# Patient Record
Sex: FEMALE | Race: BLACK OR AFRICAN AMERICAN | NOT HISPANIC OR LATINO
[De-identification: names, ages, dates, MRNs, and addresses within clinical notes are randomized per-mention and may not be internally consistent; named-entity substitution may affect disease eponyms.]

---

## 2023-07-20 ENCOUNTER — NON-APPOINTMENT (OUTPATIENT)
Age: 38
End: 2023-07-20

## 2023-08-14 ENCOUNTER — APPOINTMENT (OUTPATIENT)
Dept: OBGYN | Facility: CLINIC | Age: 38
End: 2023-08-14
Payer: COMMERCIAL

## 2023-08-14 VITALS
DIASTOLIC BLOOD PRESSURE: 80 MMHG | SYSTOLIC BLOOD PRESSURE: 122 MMHG | HEIGHT: 64 IN | WEIGHT: 220 LBS | BODY MASS INDEX: 37.56 KG/M2

## 2023-08-14 PROCEDURE — 76815 OB US LIMITED FETUS(S): CPT

## 2023-08-14 PROCEDURE — 99214 OFFICE O/P EST MOD 30 MIN: CPT | Mod: 25

## 2023-08-14 NOTE — PHYSICAL EXAM
[Appropriately responsive] : appropriately responsive [Alert] : alert [No Acute Distress] : no acute distress [Labia Majora] : normal [Labia Minora] : normal [Normal] : normal [Soft] : soft [Non-tender] : non-tender [Non-distended] : non-distended [Oriented x3] : oriented x3 [No Bleeding] : There was no active vaginal bleeding [FreeTextEntry5] : minimal brown spotting from closed cervical os

## 2023-08-14 NOTE — PROCEDURE
[Transvaginal OB Sonogram] : Transvaginal OB Sonogram [Transabdominal OB Sonogram] : Transabdominal OB Sonogram [Fetal Heart] : fetal heart present [CRL: ___ (mm)] : CRL - [unfilled]Umm [Yolk Sac] : no yolk sac [FreeTextEntry1] : Unable to visualize endometrial lining or uterine contour transvaginally. fetus + FH visualized transabdominally

## 2023-08-14 NOTE — HISTORY OF PRESENT ILLNESS
[Patient reported PAP Smear was normal] : Patient reported PAP Smear was normal [N] : Patient does not use contraception [Monogamous (Male Partner)] : is monogamous with a male partner [Y] : Positive pregnancy history [Normal Amount/Duration] :  normal amount and duration [Regular Cycle Intervals] : periods have been regular [Currently Active] : currently active [Men] : men [No] : No [PapSmeardate] : 4/26/23 [TextBox_31] : Patient reports normal, has results on phone [LMPDate] : 5/29/23 [MensesFreq] : 26-27 [MensesLength] : 5-6 [PGxTotal] : 1 [PGHxFullTerm] : 0 [PGHxPremature] : 0 [PGHxAbortions] : 0 [[facility]] : 0 [PGHxABInduced] : 0 [PGHxABSpont] : 0 [PGHxEctopic] : 0 [PGHxMultBirths] : 0 [FreeTextEntry1] : 5/29/23

## 2023-08-14 NOTE — REVIEW OF SYSTEMS
[Constipation] : constipation [Nausea] : nausea [Negative] : Heme/Lymph [Abdominal Pain] : no abdominal pain [Diarrhea] : diarrhea [Vomiting] : no vomiting

## 2023-08-14 NOTE — DISCUSSION/SUMMARY
[FreeTextEntry1] : 39yo  at 11+0 by LMP c/w 1st TM US presents for initial PNV. -Chlamydia/gonorrhea aptima collected -+FH on TAUS with CRL c/w LMP -Unable to visualize IUP or endometrial lining transvaginally - pt sent to [facility] for further scanning. Counseled pt she may hve a severely anteflexed uterus vs mullerian abnormality vs cornual pregnancy therefore SAB and ectopic precautions given in detail and pt/her partner verbalized understanding to present to nearest ED if any abnormal S&S develop. -A+ blood type from outpatient records -Pt to rerun in 1 week - if viable IUP, will receive prenatal labs with NIPT at that time

## 2023-08-15 LAB
C TRACH RRNA SPEC QL NAA+PROBE: NOT DETECTED
N GONORRHOEA RRNA SPEC QL NAA+PROBE: NOT DETECTED
SOURCE AMPLIFICATION: NORMAL

## 2023-08-22 ENCOUNTER — APPOINTMENT (OUTPATIENT)
Dept: ANTEPARTUM | Facility: CLINIC | Age: 38
End: 2023-08-22
Payer: COMMERCIAL

## 2023-08-22 ENCOUNTER — ASOB RESULT (OUTPATIENT)
Age: 38
End: 2023-08-22

## 2023-08-22 PROCEDURE — 93976 VASCULAR STUDY: CPT

## 2023-08-22 PROCEDURE — 76813 OB US NUCHAL MEAS 1 GEST: CPT

## 2023-08-25 ENCOUNTER — APPOINTMENT (OUTPATIENT)
Dept: OBGYN | Facility: CLINIC | Age: 38
End: 2023-08-25
Payer: COMMERCIAL

## 2023-08-25 VITALS
OXYGEN SATURATION: 100 % | HEART RATE: 88 BPM | BODY MASS INDEX: 37.76 KG/M2 | WEIGHT: 220 LBS | DIASTOLIC BLOOD PRESSURE: 64 MMHG | SYSTOLIC BLOOD PRESSURE: 120 MMHG

## 2023-08-25 PROCEDURE — 0501F PRENATAL FLOW SHEET: CPT

## 2023-08-25 PROCEDURE — 0500F INITIAL PRENATAL CARE VISIT: CPT

## 2023-08-26 LAB
ALBUMIN SERPL ELPH-MCNC: 4.6 G/DL
ALP BLD-CCNC: 57 U/L
ALT SERPL-CCNC: 40 U/L
ANION GAP SERPL CALC-SCNC: 12 MMOL/L
AST SERPL-CCNC: 22 U/L
BILIRUB SERPL-MCNC: 0.2 MG/DL
BUN SERPL-MCNC: 11 MG/DL
CALCIUM SERPL-MCNC: 9.8 MG/DL
CHLORIDE SERPL-SCNC: 103 MMOL/L
CO2 SERPL-SCNC: 21 MMOL/L
CREAT SERPL-MCNC: 0.62 MG/DL
EGFR: 118 ML/MIN/1.73M2
ESTIMATED AVERAGE GLUCOSE: 105 MG/DL
GLUCOSE SERPL-MCNC: 83 MG/DL
HBA1C MFR BLD HPLC: 5.3 %
HBV SURFACE AG SER QL: NONREACTIVE
HCV AB SER QL: NONREACTIVE
HCV S/CO RATIO: 0.19 S/CO
HIV1+2 AB SPEC QL IA.RAPID: NONREACTIVE
MEV IGG FLD QL IA: >300 AU/ML
MEV IGG+IGM SER-IMP: POSITIVE
POTASSIUM SERPL-SCNC: 3.7 MMOL/L
PROT SERPL-MCNC: 7 G/DL
RUBV IGG FLD-ACNC: 1.6 INDEX
RUBV IGG SER-IMP: POSITIVE
SODIUM SERPL-SCNC: 137 MMOL/L
T GONDII AB SER-IMP: NEGATIVE
T GONDII AB SER-IMP: POSITIVE
T GONDII IGG SER QL: 55.6 IU/ML
T GONDII IGM SER QL: 3.2 AU/ML
T PALLIDUM AB SER QL IA: NEGATIVE
VZV AB TITR SER: POSITIVE
VZV IGG SER IF-ACNC: 1435 INDEX

## 2023-08-27 ENCOUNTER — TRANSCRIPTION ENCOUNTER (OUTPATIENT)
Age: 38
End: 2023-08-27

## 2023-08-28 ENCOUNTER — NON-APPOINTMENT (OUTPATIENT)
Age: 38
End: 2023-08-28

## 2023-08-28 LAB
ABO + RH PNL BLD: NORMAL
BLD GP AB SCN SERPL QL: NORMAL
HGB A MFR BLD: 97 %
HGB A2 MFR BLD: 3 %
HGB FRACT BLD-IMP: NORMAL

## 2023-08-31 LAB
AR GENE MUT ANL BLD/T: NORMAL
B19V IGG SER QL IA: 1.37 INDEX
B19V IGG+IGM SER-IMP: NORMAL
B19V IGG+IGM SER-IMP: POSITIVE
B19V IGM FLD-ACNC: 0.32 INDEX
B19V IGM SER-ACNC: NEGATIVE
CFTR MUT TESTED BLD/T: NEGATIVE

## 2023-09-05 LAB — FMR1 GENE MUT ANL BLD/T: NORMAL

## 2023-09-19 ENCOUNTER — NON-APPOINTMENT (OUTPATIENT)
Age: 38
End: 2023-09-19

## 2023-09-22 ENCOUNTER — NON-APPOINTMENT (OUTPATIENT)
Age: 38
End: 2023-09-22

## 2023-09-22 ENCOUNTER — APPOINTMENT (OUTPATIENT)
Dept: OBGYN | Facility: CLINIC | Age: 38
End: 2023-09-22
Payer: COMMERCIAL

## 2023-09-22 VITALS
DIASTOLIC BLOOD PRESSURE: 70 MMHG | SYSTOLIC BLOOD PRESSURE: 110 MMHG | OXYGEN SATURATION: 98 % | WEIGHT: 217 LBS | BODY MASS INDEX: 37.25 KG/M2 | HEART RATE: 91 BPM

## 2023-09-22 DIAGNOSIS — N89.8 OTHER SPECIFIED NONINFLAMMATORY DISORDERS OF VAGINA: ICD-10-CM

## 2023-09-22 PROCEDURE — 0502F SUBSEQUENT PRENATAL CARE: CPT

## 2023-09-26 LAB
AFP MOM: 1.48
AFP VALUE: 47.6 NG/ML
ALPHA FETOPROTEIN SERUM COMMENT: NORMAL
ALPHA FETOPROTEIN SERUM INTERPRETATION: NORMAL
ALPHA FETOPROTEIN SERUM RESULTS: NORMAL
ALPHA FETOPROTEIN SERUM TEST RESULTS: NORMAL
GESTATIONAL AGE BASED ON: NORMAL
GESTATIONAL AGE ON COLLECTION DATE: 16.6 WEEKS
INSULIN DEP DIABETES: NO
MATERNAL AGE AT EDD AFP: 38 YR
MULTIPLE GESTATION: NO
OSBR RISK 1 IN: 5772
RACE: NORMAL
WEIGHT AFP: 217 LBS

## 2023-10-03 ENCOUNTER — NON-APPOINTMENT (OUTPATIENT)
Age: 38
End: 2023-10-03

## 2023-10-03 LAB
A VAGINAE DNA VAG QL NAA+PROBE: NORMAL
BVAB2 DNA VAG QL NAA+PROBE: NORMAL
C KRUSEI DNA VAG QL NAA+PROBE: NEGATIVE
C KRUSEI DNA VAG QL NAA+PROBE: POSITIVE
C TRACH RRNA SPEC QL NAA+PROBE: NEGATIVE
CANDIDA DNA VAG QL NAA+PROBE: NEGATIVE
MEGA1 DNA VAG QL NAA+PROBE: NORMAL
N GONORRHOEA RRNA SPEC QL NAA+PROBE: NEGATIVE
T VAGINALIS RRNA SPEC QL NAA+PROBE: NEGATIVE

## 2023-10-12 ENCOUNTER — NON-APPOINTMENT (OUTPATIENT)
Age: 38
End: 2023-10-12

## 2023-10-12 DIAGNOSIS — B37.31 ACUTE CANDIDIASIS OF VULVA AND VAGINA: ICD-10-CM

## 2023-10-12 RX ORDER — CLOTRIMAZOLE 1 %
1 CREAM WITH APPLICATOR VAGINAL
Qty: 1 | Refills: 0 | Status: ACTIVE | COMMUNITY
Start: 2023-10-12 | End: 1900-01-01

## 2023-10-16 ENCOUNTER — APPOINTMENT (OUTPATIENT)
Dept: ANTEPARTUM | Facility: CLINIC | Age: 38
End: 2023-10-16
Payer: COMMERCIAL

## 2023-10-16 ENCOUNTER — ASOB RESULT (OUTPATIENT)
Age: 38
End: 2023-10-16

## 2023-10-16 PROCEDURE — 76817 TRANSVAGINAL US OBSTETRIC: CPT

## 2023-10-16 PROCEDURE — 76811 OB US DETAILED SNGL FETUS: CPT

## 2023-10-18 ENCOUNTER — NON-APPOINTMENT (OUTPATIENT)
Age: 38
End: 2023-10-18

## 2023-10-18 ENCOUNTER — APPOINTMENT (OUTPATIENT)
Dept: OBGYN | Facility: CLINIC | Age: 38
End: 2023-10-18
Payer: COMMERCIAL

## 2023-10-18 VITALS
BODY MASS INDEX: 36.39 KG/M2 | OXYGEN SATURATION: 100 % | WEIGHT: 212 LBS | DIASTOLIC BLOOD PRESSURE: 70 MMHG | SYSTOLIC BLOOD PRESSURE: 120 MMHG | HEART RATE: 97 BPM

## 2023-10-18 DIAGNOSIS — Z3A.11 11 WEEKS GESTATION OF PREGNANCY: ICD-10-CM

## 2023-10-18 PROCEDURE — 0502F SUBSEQUENT PRENATAL CARE: CPT

## 2023-11-14 ENCOUNTER — ASOB RESULT (OUTPATIENT)
Age: 38
End: 2023-11-14

## 2023-11-14 ENCOUNTER — APPOINTMENT (OUTPATIENT)
Dept: ANTEPARTUM | Facility: CLINIC | Age: 38
End: 2023-11-14
Payer: COMMERCIAL

## 2023-11-14 PROCEDURE — 76816 OB US FOLLOW-UP PER FETUS: CPT

## 2023-11-14 PROCEDURE — 76820 UMBILICAL ARTERY ECHO: CPT | Mod: 59

## 2023-11-14 PROCEDURE — 76819 FETAL BIOPHYS PROFIL W/O NST: CPT

## 2023-11-15 ENCOUNTER — LABORATORY RESULT (OUTPATIENT)
Age: 38
End: 2023-11-15

## 2023-11-15 ENCOUNTER — APPOINTMENT (OUTPATIENT)
Dept: OBGYN | Facility: CLINIC | Age: 38
End: 2023-11-15
Payer: COMMERCIAL

## 2023-11-15 ENCOUNTER — NON-APPOINTMENT (OUTPATIENT)
Age: 38
End: 2023-11-15

## 2023-11-15 VITALS
OXYGEN SATURATION: 100 % | BODY MASS INDEX: 36.22 KG/M2 | HEART RATE: 118 BPM | WEIGHT: 211 LBS | DIASTOLIC BLOOD PRESSURE: 82 MMHG | SYSTOLIC BLOOD PRESSURE: 140 MMHG

## 2023-11-15 PROCEDURE — 0502F SUBSEQUENT PRENATAL CARE: CPT

## 2023-11-16 LAB
GLUCOSE 1H P 50 G GLC PO SERPL-MCNC: 123 MG/DL
HCT VFR BLD CALC: 31.1 %
HGB BLD-MCNC: 9.9 G/DL
MCHC RBC-ENTMCNC: 31.2 PG
MCHC RBC-ENTMCNC: 31.8 GM/DL
MCV RBC AUTO: 98.1 FL
PLATELET # BLD AUTO: 356 K/UL
RBC # BLD: 3.17 M/UL
RBC # FLD: 13.4 %
T PALLIDUM AB SER QL IA: NEGATIVE
WBC # FLD AUTO: 7.59 K/UL

## 2023-11-16 RX ORDER — BLOOD PRESSURE TEST KIT-LARGE
KIT MISCELLANEOUS
Qty: 1 | Refills: 0 | Status: ACTIVE | COMMUNITY
Start: 2023-11-16 | End: 1900-01-01

## 2023-11-20 ENCOUNTER — NON-APPOINTMENT (OUTPATIENT)
Age: 38
End: 2023-11-20

## 2023-11-20 LAB — BACTERIA UR CULT: NORMAL

## 2023-11-30 ENCOUNTER — APPOINTMENT (OUTPATIENT)
Dept: OBGYN | Facility: CLINIC | Age: 38
End: 2023-11-30
Payer: COMMERCIAL

## 2023-11-30 VITALS
OXYGEN SATURATION: 98 % | SYSTOLIC BLOOD PRESSURE: 120 MMHG | HEART RATE: 99 BPM | WEIGHT: 212 LBS | BODY MASS INDEX: 36.39 KG/M2 | DIASTOLIC BLOOD PRESSURE: 78 MMHG

## 2023-11-30 PROCEDURE — 0502F SUBSEQUENT PRENATAL CARE: CPT

## 2023-12-01 LAB
CREAT 24H UR-MCNC: 0.7 G/24 H
CREAT ?TM UR-MCNC: 71 MG/DL
PROT 24H UR-MRATE: 11 MG/DL
PROT ?TM UR-MCNC: 24 HR
PROT UR-MCNC: 110 MG/24 H
SPECIMEN VOL 24H UR: 1000 ML

## 2023-12-18 ENCOUNTER — NON-APPOINTMENT (OUTPATIENT)
Age: 38
End: 2023-12-18

## 2023-12-18 ENCOUNTER — APPOINTMENT (OUTPATIENT)
Dept: OBGYN | Facility: CLINIC | Age: 38
End: 2023-12-18
Payer: COMMERCIAL

## 2023-12-18 VITALS
DIASTOLIC BLOOD PRESSURE: 70 MMHG | SYSTOLIC BLOOD PRESSURE: 110 MMHG | BODY MASS INDEX: 35.87 KG/M2 | OXYGEN SATURATION: 98 % | WEIGHT: 209 LBS

## 2023-12-18 PROCEDURE — 0502F SUBSEQUENT PRENATAL CARE: CPT

## 2023-12-21 ENCOUNTER — LABORATORY RESULT (OUTPATIENT)
Age: 38
End: 2023-12-21

## 2023-12-21 ENCOUNTER — APPOINTMENT (OUTPATIENT)
Dept: HEMATOLOGY ONCOLOGY | Facility: CLINIC | Age: 38
End: 2023-12-21
Payer: COMMERCIAL

## 2023-12-21 VITALS
BODY MASS INDEX: 35.51 KG/M2 | HEIGHT: 64 IN | TEMPERATURE: 97.8 F | DIASTOLIC BLOOD PRESSURE: 93 MMHG | RESPIRATION RATE: 18 BRPM | OXYGEN SATURATION: 97 % | SYSTOLIC BLOOD PRESSURE: 130 MMHG | HEART RATE: 102 BPM | WEIGHT: 208 LBS

## 2023-12-21 PROCEDURE — 99204 OFFICE O/P NEW MOD 45 MIN: CPT

## 2023-12-22 LAB
ALBUMIN SERPL ELPH-MCNC: 3.2 G/DL
ALP BLD-CCNC: 116 U/L
ALT SERPL-CCNC: 29 U/L
ANION GAP SERPL CALC-SCNC: 6 MMOL/L
AST SERPL-CCNC: 30 U/L
BILIRUB SERPL-MCNC: 0.7 MG/DL
BUN SERPL-MCNC: 7 MG/DL
CALCIUM SERPL-MCNC: 9.8 MG/DL
CHLORIDE SERPL-SCNC: 108 MMOL/L
CO2 SERPL-SCNC: 25 MMOL/L
CREAT SERPL-MCNC: 0.6 MG/DL
EGFR: 118 ML/MIN/1.73M2
FERRITIN SERPL-MCNC: 84 NG/ML
FOLATE SERPL-MCNC: >20 NG/ML
GLUCOSE SERPL-MCNC: 68 MG/DL
HAPTOGLOB SERPL-MCNC: 250 MG/DL
IRON SATN MFR SERPL: 17 %
IRON SERPL-MCNC: 69 UG/DL
LDH SERPL-CCNC: 226 U/L
POTASSIUM SERPL-SCNC: 4.2 MMOL/L
PROT SERPL-MCNC: 7.1 G/DL
RBC # BLD: 3.21 M/UL
RETICS # AUTO: 2 %
RETICS AGGREG/RBC NFR: 64.5 K/UL
SODIUM SERPL-SCNC: 139 MMOL/L
TIBC SERPL-MCNC: 396 UG/DL
TSH SERPL-ACNC: 0.62 UIU/ML
UIBC SERPL-MCNC: 327 UG/DL
VIT B12 SERPL-MCNC: 529 PG/ML

## 2023-12-23 NOTE — ASSESSMENT
[FreeTextEntry1] : 39 yo F, 29 weeks pregnant, here for initial evaluation.  Labs notable for normocytic anemia, Hb 9.9. Iron studies with very mild iron deficiency, iron saturation 12%. No other cytopenias and per patient she was never told she is anemic before this. Labs in EMR dating back to August 223, start of pregnancy, at which point she was already slightly anemic (Hb 11.2). This might be an underlying hemoglobinopathy (like thalassemia trait). In pregnancy, Physiologic changes during pregnancy result in dilutional anemia despite an overall increase in red blood cell (RBC) mass. Plasma volume increases by 10 to 15 percent at 6 to 12 weeks of gestation, expands rapidly until 30 to 34 weeks, and then plateaus or decreases slightly to term. The total gain at term averages 1100 to 1600 mL and results in a total plasma volume of 4700 to 5200 mL, which is 40 to 50 percent above that prior to pregnancy [3]. The RBC mass also increases, but to a lesser extent (approximately 15 to 25 percent). Typically, these changes result in mild anemia (hemoglobin 10 to 11 g/dL), but there is no specific hemoglobin value that can be used to distinguish physiologic dilutional anemia from other causes of anemia.  Therefore, since at start of pregnancy her Hb was 11.2, this could be all dilutional anemia.  Will check: CBC, Iron panel, hemolysis labs, B12, folate, TSH. RTC in 2 wks to review results and plan next steps. Continue with iron tabs as prescribed by OB until then. All questions answered.

## 2023-12-23 NOTE — HISTORY OF PRESENT ILLNESS
[de-identified] : Offers no complaints. Mild fatigue since start of pregnancy, o/w no bruising bleeding, no SOB, no CLARK. [de-identified] : Юлия Payan is a 38-year-old female 29 wk pregnant,who presents to the clinic for initial consultaiton of anemia.   Heme hx: Never anemic before per patient. She never needed a blood transfuson. FH: DM and cancer PMH: None PSH: dental work SH: no etOH use, no smoking Allergies: NKDA Meds: Prenatal vitamins, 325 mg Fe sulfate daily, ASA 81 (risk of preeclmsia), mg oxide 11/15/2023: Labs done with OB, showing Hb 9.9, MCV 98.1, no other cytopenias. Iron studies with iron saturation of 12%. Ferritin 81. She was subsequently started on po iron supplementation, which she has been taking for 2 wks now.

## 2024-01-02 ENCOUNTER — NON-APPOINTMENT (OUTPATIENT)
Age: 39
End: 2024-01-02

## 2024-01-05 ENCOUNTER — APPOINTMENT (OUTPATIENT)
Dept: OBGYN | Facility: CLINIC | Age: 39
End: 2024-01-05
Payer: MEDICAID

## 2024-01-05 VITALS
OXYGEN SATURATION: 99 % | DIASTOLIC BLOOD PRESSURE: 70 MMHG | WEIGHT: 217 LBS | SYSTOLIC BLOOD PRESSURE: 120 MMHG | BODY MASS INDEX: 37.25 KG/M2

## 2024-01-05 DIAGNOSIS — D25.9 LEIOMYOMA OF UTERUS, UNSPECIFIED: ICD-10-CM

## 2024-01-05 PROCEDURE — 0502F SUBSEQUENT PRENATAL CARE: CPT

## 2024-01-05 RX ORDER — RESPIRATORY SYNCYTIAL VIRUS VACCINE 120MCG/0.5
120 KIT INTRAMUSCULAR
Qty: 1 | Refills: 0 | Status: ACTIVE | COMMUNITY
Start: 2024-01-05 | End: 1900-01-01

## 2024-01-09 ENCOUNTER — ASOB RESULT (OUTPATIENT)
Age: 39
End: 2024-01-09

## 2024-01-09 ENCOUNTER — APPOINTMENT (OUTPATIENT)
Dept: ANTEPARTUM | Facility: CLINIC | Age: 39
End: 2024-01-09
Payer: MEDICAID

## 2024-01-09 PROCEDURE — 76819 FETAL BIOPHYS PROFIL W/O NST: CPT | Mod: 59

## 2024-01-09 PROCEDURE — 76816 OB US FOLLOW-UP PER FETUS: CPT

## 2024-01-09 PROCEDURE — 76820 UMBILICAL ARTERY ECHO: CPT | Mod: 59

## 2024-01-18 ENCOUNTER — LABORATORY RESULT (OUTPATIENT)
Age: 39
End: 2024-01-18

## 2024-01-18 ENCOUNTER — APPOINTMENT (OUTPATIENT)
Dept: HEMATOLOGY ONCOLOGY | Facility: CLINIC | Age: 39
End: 2024-01-18
Payer: MEDICAID

## 2024-01-18 VITALS
TEMPERATURE: 97.9 F | RESPIRATION RATE: 18 BRPM | BODY MASS INDEX: 35.85 KG/M2 | HEART RATE: 108 BPM | OXYGEN SATURATION: 97 % | HEIGHT: 64 IN | WEIGHT: 210 LBS | SYSTOLIC BLOOD PRESSURE: 127 MMHG | DIASTOLIC BLOOD PRESSURE: 74 MMHG

## 2024-01-18 LAB
FERRITIN SERPL-MCNC: 57 NG/ML
IRON SATN MFR SERPL: 14 %
IRON SERPL-MCNC: 66 UG/DL
TIBC SERPL-MCNC: 473 UG/DL
UIBC SERPL-MCNC: 407 UG/DL

## 2024-01-18 PROCEDURE — 99213 OFFICE O/P EST LOW 20 MIN: CPT

## 2024-01-19 ENCOUNTER — APPOINTMENT (OUTPATIENT)
Dept: OBGYN | Facility: CLINIC | Age: 39
End: 2024-01-19
Payer: MEDICAID

## 2024-01-19 ENCOUNTER — NON-APPOINTMENT (OUTPATIENT)
Age: 39
End: 2024-01-19

## 2024-01-19 VITALS
HEART RATE: 98 BPM | BODY MASS INDEX: 36.39 KG/M2 | OXYGEN SATURATION: 98 % | DIASTOLIC BLOOD PRESSURE: 72 MMHG | WEIGHT: 212 LBS | SYSTOLIC BLOOD PRESSURE: 118 MMHG

## 2024-01-19 LAB
FOLATE SERPL-MCNC: >20 NG/ML
VIT B12 SERPL-MCNC: 563 PG/ML

## 2024-01-19 PROCEDURE — 0502F SUBSEQUENT PRENATAL CARE: CPT

## 2024-01-23 ENCOUNTER — OUTPATIENT (OUTPATIENT)
Dept: OUTPATIENT SERVICES | Facility: HOSPITAL | Age: 39
LOS: 1 days | End: 2024-01-23
Payer: COMMERCIAL

## 2024-01-23 ENCOUNTER — APPOINTMENT (OUTPATIENT)
Dept: INFUSION THERAPY | Facility: CLINIC | Age: 39
End: 2024-01-23

## 2024-01-23 VITALS
TEMPERATURE: 98 F | DIASTOLIC BLOOD PRESSURE: 82 MMHG | HEART RATE: 105 BPM | RESPIRATION RATE: 18 BRPM | SYSTOLIC BLOOD PRESSURE: 129 MMHG | HEIGHT: 64 IN | WEIGHT: 214.95 LBS

## 2024-01-23 DIAGNOSIS — O99.019 ANEMIA COMPLICATING PREGNANCY, UNSPECIFIED TRIMESTER: ICD-10-CM

## 2024-01-23 PROCEDURE — 96365 THER/PROPH/DIAG IV INF INIT: CPT

## 2024-01-23 RX ORDER — IRON SUCROSE 20 MG/ML
200 INJECTION, SOLUTION INTRAVENOUS ONCE
Refills: 0 | Status: COMPLETED | OUTPATIENT
Start: 2024-01-23 | End: 2024-01-23

## 2024-01-23 RX ADMIN — IRON SUCROSE 200 MILLIGRAM(S): 20 INJECTION, SOLUTION INTRAVENOUS at 18:01

## 2024-01-23 RX ADMIN — IRON SUCROSE 110 MILLIGRAM(S): 20 INJECTION, SOLUTION INTRAVENOUS at 17:01

## 2024-01-23 NOTE — ASSESSMENT
[FreeTextEntry1] : 39 yo F, 33 weeks, here for follow up of anemia.  Normocytic anemia-hemoglobin 10.0, MCV 93.7 today No other cytopenias and per patient she was never told she is anemic before this. Labs in EMR dating back to , start of pregnancy, at which point she was already slightly anemic (Hb 11.2). This might be an underlying hemoglobinopathy (like thalassemia trait). In pregnancy, Physiologic changes during pregnancy result in dilutional anemia despite an overall increase in red blood cell (RBC) mass. Plasma volume increases by 10 to 15 percent at 6 to 12 weeks of gestation, expands rapidly until 30 to 34 weeks, and then plateaus or decreases slightly to term. The total gain at term averages 1100 to 1600 mL and results in a total plasma volume of 4700 to 5200 mL, which is 40 to 50 percent above that prior to pregnancy [3]. The RBC mass also increases, but to a lesser extent (approximately 15 to 25 percent). Typically, these changes result in mild anemia (hemoglobin 10 to 11 g/dL), but there is no specific hemoglobin value that can be used to distinguish physiologic dilutional anemia from other causes of anemia.  Therefore, since at start of pregnancy her Hb was 11.2, this could be all dilutional anemia.  Overall no evidence of hemolytic anemia, mild iron deficiency noted, she is compliant with iron tabs 1 tab daily.  Will repeat her iron levels today, if still on the borderline low we will give her 1 dose IV iron. She is planned for a . C/w iron tabs Will order IV Venofer x1 or 2 doses depending on result from today Risks and benefits of IV iron infusion discussed, including but not limited limited to allergic reactions Repeat Iron panel 3 wks post delivery Will need testing for thalassemia as well All questions answered  RTC in 3 months

## 2024-01-23 NOTE — HISTORY OF PRESENT ILLNESS
[de-identified] : Юлия Payan is a 38-year-old female 33 wk pregnant, who is here for follow up of anemia.  Heme hx: Never anemic before per patient. She never needed a blood transfuson. FH: DM and cancer PMH: None PSH: dental work SH: no etOH use, no smoking Allergies: NKDA Meds: Prenatal vitamins, 325 mg Fe sulfate daily, ASA 81 (risk of preeclmsia), mg oxide 11/15/2023: Labs done with OB, showing Hb 9.9, MCV 98.1, no other cytopenias. Iron studies with iron saturation of 12%. Ferritin 81. She was subsequently started on po iron supplementation, which she has been taking for 2 wks now. 12/21/2023: Ferritin 57, Iron sat 17%, Hb 9.8, MCV 93.2 [de-identified] : Offers no complaints. Mild fatigue since start of pregnancy, o/w no bruising bleeding, no SOB, no CLARK.

## 2024-01-23 NOTE — HISTORY OF PRESENT ILLNESS
[de-identified] : Юлия Payan is a 38-year-old female 33 wk pregnant, who is here for follow up of anemia.  Heme hx: Never anemic before per patient. She never needed a blood transfuson. FH: DM and cancer PMH: None PSH: dental work SH: no etOH use, no smoking Allergies: NKDA Meds: Prenatal vitamins, 325 mg Fe sulfate daily, ASA 81 (risk of preeclmsia), mg oxide 11/15/2023: Labs done with OB, showing Hb 9.9, MCV 98.1, no other cytopenias. Iron studies with iron saturation of 12%. Ferritin 81. She was subsequently started on po iron supplementation, which she has been taking for 2 wks now. 12/21/2023: Ferritin 57, Iron sat 17%, Hb 9.8, MCV 93.2 [de-identified] : Offers no complaints. Mild fatigue since start of pregnancy, o/w no bruising bleeding, no SOB, no CLARK.

## 2024-01-25 ENCOUNTER — NON-APPOINTMENT (OUTPATIENT)
Age: 39
End: 2024-01-25

## 2024-01-29 ENCOUNTER — APPOINTMENT (OUTPATIENT)
Dept: OBGYN | Facility: CLINIC | Age: 39
End: 2024-01-29
Payer: MEDICAID

## 2024-01-29 VITALS
DIASTOLIC BLOOD PRESSURE: 70 MMHG | WEIGHT: 215 LBS | HEART RATE: 94 BPM | OXYGEN SATURATION: 98 % | BODY MASS INDEX: 36.9 KG/M2 | SYSTOLIC BLOOD PRESSURE: 120 MMHG

## 2024-01-29 PROCEDURE — 0502F SUBSEQUENT PRENATAL CARE: CPT

## 2024-01-30 ENCOUNTER — APPOINTMENT (OUTPATIENT)
Dept: INFUSION THERAPY | Facility: CLINIC | Age: 39
End: 2024-01-30

## 2024-02-05 ENCOUNTER — APPOINTMENT (OUTPATIENT)
Dept: OBGYN | Facility: CLINIC | Age: 39
End: 2024-02-05
Payer: MEDICAID

## 2024-02-05 DIAGNOSIS — O13.9 GESTATIONAL [PREGNANCY-INDUCED] HYPERTENSION W/OUT SIGNIFICANT PROTEINURIA, UNSPECIFIED TRIMESTER: ICD-10-CM

## 2024-02-05 PROCEDURE — 99214 OFFICE O/P EST MOD 30 MIN: CPT | Mod: 57

## 2024-02-05 RX ORDER — CHLORHEXIDINE GLUCONATE 4 %
325 (65 FE) LIQUID (ML) TOPICAL
Qty: 60 | Refills: 2 | Status: ACTIVE | COMMUNITY
Start: 2023-11-16 | End: 1900-01-01

## 2024-02-05 RX ORDER — PRENATAL VIT NO.126/IRON/FOLIC 28MG-0.8MG
28-0.8 TABLET ORAL DAILY
Qty: 30 | Refills: 2 | Status: ACTIVE | COMMUNITY
Start: 2024-02-05 | End: 1900-01-01

## 2024-02-06 ENCOUNTER — ASOB RESULT (OUTPATIENT)
Age: 39
End: 2024-02-06

## 2024-02-06 ENCOUNTER — APPOINTMENT (OUTPATIENT)
Dept: ANTEPARTUM | Facility: CLINIC | Age: 39
End: 2024-02-06
Payer: MEDICAID

## 2024-02-06 LAB
BASOPHILS # BLD AUTO: 0.01 K/UL
BASOPHILS NFR BLD AUTO: 0.2 %
EOSINOPHIL # BLD AUTO: 0.04 K/UL
EOSINOPHIL NFR BLD AUTO: 0.7 %
HCT VFR BLD CALC: 33.5 %
HCV AB SER QL: NONREACTIVE
HCV S/CO RATIO: 0.23 S/CO
HGB BLD-MCNC: 10.3 G/DL
HIV1+2 AB SPEC QL IA.RAPID: NONREACTIVE
IMM GRANULOCYTES NFR BLD AUTO: 0.7 %
LYMPHOCYTES # BLD AUTO: 1.26 K/UL
LYMPHOCYTES NFR BLD AUTO: 20.7 %
MAN DIFF?: NORMAL
MCHC RBC-ENTMCNC: 30.2 PG
MCHC RBC-ENTMCNC: 30.7 GM/DL
MCV RBC AUTO: 98.2 FL
MONOCYTES # BLD AUTO: 0.51 K/UL
MONOCYTES NFR BLD AUTO: 8.4 %
NEUTROPHILS # BLD AUTO: 4.24 K/UL
NEUTROPHILS NFR BLD AUTO: 69.3 %
PLATELET # BLD AUTO: 312 K/UL
RBC # BLD: 3.41 M/UL
RBC # FLD: 16.1 %
T PALLIDUM AB SER QL IA: NEGATIVE
WBC # FLD AUTO: 6.1 K/UL

## 2024-02-06 PROCEDURE — 76820 UMBILICAL ARTERY ECHO: CPT | Mod: 59

## 2024-02-06 PROCEDURE — 76819 FETAL BIOPHYS PROFIL W/O NST: CPT

## 2024-02-06 PROCEDURE — 76816 OB US FOLLOW-UP PER FETUS: CPT

## 2024-02-07 LAB
GP B STREP DNA SPEC QL NAA+PROBE: NOT DETECTED
SOURCE GBS: NORMAL

## 2024-02-08 LAB
CREAT 24H UR-MCNC: 1 G/24 H
CREAT ?TM UR-MCNC: 59 MG/DL
PROT 24H UR-MRATE: 29 MG/DL
PROT ?TM UR-MCNC: 24 HR
PROT UR-MCNC: 508 MG/24 H
SPECIMEN VOL 24H UR: 1750 ML

## 2024-02-09 ENCOUNTER — APPOINTMENT (OUTPATIENT)
Dept: OBGYN | Facility: CLINIC | Age: 39
End: 2024-02-09
Payer: MEDICAID

## 2024-02-09 VITALS
WEIGHT: 218 LBS | SYSTOLIC BLOOD PRESSURE: 120 MMHG | OXYGEN SATURATION: 98 % | HEART RATE: 98 BPM | DIASTOLIC BLOOD PRESSURE: 80 MMHG | BODY MASS INDEX: 37.42 KG/M2

## 2024-02-09 DIAGNOSIS — Z01.818 ENCOUNTER FOR OTHER PREPROCEDURAL EXAMINATION: ICD-10-CM

## 2024-02-09 PROCEDURE — 0502F SUBSEQUENT PRENATAL CARE: CPT

## 2024-02-10 ENCOUNTER — OUTPATIENT (OUTPATIENT)
Dept: OUTPATIENT SERVICES | Facility: HOSPITAL | Age: 39
LOS: 1 days | End: 2024-02-10
Payer: COMMERCIAL

## 2024-02-10 DIAGNOSIS — Z01.818 ENCOUNTER FOR OTHER PREPROCEDURAL EXAMINATION: ICD-10-CM

## 2024-02-10 LAB
ALBUMIN SERPL ELPH-MCNC: 3.4 G/DL — SIGNIFICANT CHANGE UP (ref 3.3–5)
ALP SERPL-CCNC: 229 U/L — HIGH (ref 40–120)
ALT FLD-CCNC: 11 U/L — SIGNIFICANT CHANGE UP (ref 10–45)
ANION GAP SERPL CALC-SCNC: 12 MMOL/L — SIGNIFICANT CHANGE UP (ref 5–17)
APTT BLD: 31.5 SEC — SIGNIFICANT CHANGE UP (ref 24.5–35.6)
AST SERPL-CCNC: 12 U/L — SIGNIFICANT CHANGE UP (ref 10–40)
BASOPHILS # BLD AUTO: 0.02 K/UL — SIGNIFICANT CHANGE UP (ref 0–0.2)
BASOPHILS NFR BLD AUTO: 0.3 % — SIGNIFICANT CHANGE UP (ref 0–2)
BILIRUB SERPL-MCNC: 0.2 MG/DL — SIGNIFICANT CHANGE UP (ref 0.2–1.2)
BLD GP AB SCN SERPL QL: NEGATIVE — SIGNIFICANT CHANGE UP
BUN SERPL-MCNC: 7 MG/DL — SIGNIFICANT CHANGE UP (ref 7–23)
CALCIUM SERPL-MCNC: 9.2 MG/DL — SIGNIFICANT CHANGE UP (ref 8.4–10.5)
CHLORIDE SERPL-SCNC: 104 MMOL/L — SIGNIFICANT CHANGE UP (ref 96–108)
CO2 SERPL-SCNC: 20 MMOL/L — LOW (ref 22–31)
CREAT SERPL-MCNC: 0.55 MG/DL — SIGNIFICANT CHANGE UP (ref 0.5–1.3)
EGFR: 121 ML/MIN/1.73M2 — SIGNIFICANT CHANGE UP
EOSINOPHIL # BLD AUTO: 0.03 K/UL — SIGNIFICANT CHANGE UP (ref 0–0.5)
EOSINOPHIL NFR BLD AUTO: 0.5 % — SIGNIFICANT CHANGE UP (ref 0–6)
GLUCOSE SERPL-MCNC: 82 MG/DL — SIGNIFICANT CHANGE UP (ref 70–99)
HCT VFR BLD CALC: 32.6 % — LOW (ref 34.5–45)
HGB BLD-MCNC: 10.4 G/DL — LOW (ref 11.5–15.5)
IMM GRANULOCYTES NFR BLD AUTO: 0.6 % — SIGNIFICANT CHANGE UP (ref 0–0.9)
INR BLD: 0.9 — SIGNIFICANT CHANGE UP (ref 0.85–1.18)
LYMPHOCYTES # BLD AUTO: 1.4 K/UL — SIGNIFICANT CHANGE UP (ref 1–3.3)
LYMPHOCYTES # BLD AUTO: 22.5 % — SIGNIFICANT CHANGE UP (ref 13–44)
MCHC RBC-ENTMCNC: 31.1 PG — SIGNIFICANT CHANGE UP (ref 27–34)
MCHC RBC-ENTMCNC: 31.9 GM/DL — LOW (ref 32–36)
MCV RBC AUTO: 97.6 FL — SIGNIFICANT CHANGE UP (ref 80–100)
MONOCYTES # BLD AUTO: 0.54 K/UL — SIGNIFICANT CHANGE UP (ref 0–0.9)
MONOCYTES NFR BLD AUTO: 8.7 % — SIGNIFICANT CHANGE UP (ref 2–14)
NEUTROPHILS # BLD AUTO: 4.2 K/UL — SIGNIFICANT CHANGE UP (ref 1.8–7.4)
NEUTROPHILS NFR BLD AUTO: 67.4 % — SIGNIFICANT CHANGE UP (ref 43–77)
NRBC # BLD: 0 /100 WBCS — SIGNIFICANT CHANGE UP (ref 0–0)
PLATELET # BLD AUTO: 298 K/UL — SIGNIFICANT CHANGE UP (ref 150–400)
POTASSIUM SERPL-MCNC: 4 MMOL/L — SIGNIFICANT CHANGE UP (ref 3.5–5.3)
POTASSIUM SERPL-SCNC: 4 MMOL/L — SIGNIFICANT CHANGE UP (ref 3.5–5.3)
PROT SERPL-MCNC: 6.5 G/DL — SIGNIFICANT CHANGE UP (ref 6–8.3)
PROTHROM AB SERPL-ACNC: 10.3 SEC — SIGNIFICANT CHANGE UP (ref 9.5–13)
RBC # BLD: 3.34 M/UL — LOW (ref 3.8–5.2)
RBC # FLD: 15.9 % — HIGH (ref 10.3–14.5)
RH IG SCN BLD-IMP: POSITIVE — SIGNIFICANT CHANGE UP
SODIUM SERPL-SCNC: 136 MMOL/L — SIGNIFICANT CHANGE UP (ref 135–145)
WBC # BLD: 6.23 K/UL — SIGNIFICANT CHANGE UP (ref 3.8–10.5)
WBC # FLD AUTO: 6.23 K/UL — SIGNIFICANT CHANGE UP (ref 3.8–10.5)

## 2024-02-10 PROCEDURE — 85730 THROMBOPLASTIN TIME PARTIAL: CPT

## 2024-02-10 PROCEDURE — 86850 RBC ANTIBODY SCREEN: CPT

## 2024-02-10 PROCEDURE — 85025 COMPLETE CBC W/AUTO DIFF WBC: CPT

## 2024-02-10 PROCEDURE — 85610 PROTHROMBIN TIME: CPT

## 2024-02-10 PROCEDURE — 80053 COMPREHEN METABOLIC PANEL: CPT

## 2024-02-10 PROCEDURE — 86900 BLOOD TYPING SEROLOGIC ABO: CPT

## 2024-02-10 PROCEDURE — 86901 BLOOD TYPING SEROLOGIC RH(D): CPT

## 2024-02-11 ENCOUNTER — TRANSCRIPTION ENCOUNTER (OUTPATIENT)
Age: 39
End: 2024-02-11

## 2024-02-12 ENCOUNTER — INPATIENT (INPATIENT)
Facility: HOSPITAL | Age: 39
LOS: 2 days | Discharge: HOME | End: 2024-02-15
Attending: OBSTETRICS & GYNECOLOGY | Admitting: OBSTETRICS & GYNECOLOGY
Payer: COMMERCIAL

## 2024-02-12 ENCOUNTER — RESULT REVIEW (OUTPATIENT)
Age: 39
End: 2024-02-12

## 2024-02-12 VITALS
OXYGEN SATURATION: 100 % | TEMPERATURE: 97 F | SYSTOLIC BLOOD PRESSURE: 123 MMHG | HEIGHT: 64 IN | RESPIRATION RATE: 18 BRPM | HEART RATE: 79 BPM | WEIGHT: 218.26 LBS | DIASTOLIC BLOOD PRESSURE: 83 MMHG

## 2024-02-12 LAB
ANION GAP SERPL CALC-SCNC: 9 MMOL/L — SIGNIFICANT CHANGE UP (ref 5–17)
BASE EXCESS BLDV CALC-SCNC: -5.9 MMOL/L — LOW (ref -2–3)
BLD GP AB SCN SERPL QL: NEGATIVE — SIGNIFICANT CHANGE UP
BUN SERPL-MCNC: 8 MG/DL — SIGNIFICANT CHANGE UP (ref 7–23)
CALCIUM SERPL-MCNC: 8.6 MG/DL — SIGNIFICANT CHANGE UP (ref 8.4–10.5)
CHLORIDE SERPL-SCNC: 107 MMOL/L — SIGNIFICANT CHANGE UP (ref 96–108)
CO2 SERPL-SCNC: 20 MMOL/L — LOW (ref 22–31)
COHGB MFR BLDV: 1.3 % — SIGNIFICANT CHANGE UP
CREAT SERPL-MCNC: 0.52 MG/DL — SIGNIFICANT CHANGE UP (ref 0.5–1.3)
EGFR: 123 ML/MIN/1.73M2 — SIGNIFICANT CHANGE UP
GLUCOSE SERPL-MCNC: 95 MG/DL — SIGNIFICANT CHANGE UP (ref 70–99)
HCO3 BLDV-SCNC: 20 MMOL/L — LOW (ref 22–29)
HCT VFR BLD CALC: 31.3 % — LOW (ref 34.5–45)
HGB BLD CALC-MCNC: 8.3 G/DL — LOW (ref 11.7–16.1)
HGB BLD-MCNC: 10.1 G/DL — LOW (ref 11.5–15.5)
MAGNESIUM SERPL-MCNC: 1.7 MG/DL — SIGNIFICANT CHANGE UP (ref 1.6–2.6)
MCHC RBC-ENTMCNC: 30.8 PG — SIGNIFICANT CHANGE UP (ref 27–34)
MCHC RBC-ENTMCNC: 32.3 GM/DL — SIGNIFICANT CHANGE UP (ref 32–36)
MCV RBC AUTO: 95.4 FL — SIGNIFICANT CHANGE UP (ref 80–100)
METHGB MFR BLDV: 0.4 % — SIGNIFICANT CHANGE UP
NRBC # BLD: 0 /100 WBCS — SIGNIFICANT CHANGE UP (ref 0–0)
PCO2 BLDV: 37 MMHG — LOW (ref 39–42)
PH BLDV: 7.33 — SIGNIFICANT CHANGE UP (ref 7.32–7.43)
PHOSPHATE SERPL-MCNC: 3.1 MG/DL — SIGNIFICANT CHANGE UP (ref 2.5–4.5)
PLATELET # BLD AUTO: 237 K/UL — SIGNIFICANT CHANGE UP (ref 150–400)
PO2 BLDV: 44 MMHG — SIGNIFICANT CHANGE UP (ref 25–45)
POTASSIUM SERPL-MCNC: 4.4 MMOL/L — SIGNIFICANT CHANGE UP (ref 3.5–5.3)
POTASSIUM SERPL-SCNC: 4.4 MMOL/L — SIGNIFICANT CHANGE UP (ref 3.5–5.3)
RBC # BLD: 3.28 M/UL — LOW (ref 3.8–5.2)
RBC # FLD: 15.7 % — HIGH (ref 10.3–14.5)
RH IG SCN BLD-IMP: POSITIVE — SIGNIFICANT CHANGE UP
SAO2 % BLDV: 69 % — SIGNIFICANT CHANGE UP (ref 67–88)
SODIUM SERPL-SCNC: 136 MMOL/L — SIGNIFICANT CHANGE UP (ref 135–145)
T PALLIDUM AB TITR SER: NEGATIVE — SIGNIFICANT CHANGE UP
WBC # BLD: 18.09 K/UL — HIGH (ref 3.8–10.5)
WBC # FLD AUTO: 18.09 K/UL — HIGH (ref 3.8–10.5)

## 2024-02-12 PROCEDURE — 59510 CESAREAN DELIVERY: CPT | Mod: U7

## 2024-02-12 PROCEDURE — 88305 TISSUE EXAM BY PATHOLOGIST: CPT | Mod: 26

## 2024-02-12 PROCEDURE — 88307 TISSUE EXAM BY PATHOLOGIST: CPT | Mod: 26

## 2024-02-12 DEVICE — INTERCEED 3 X 4": Type: IMPLANTABLE DEVICE | Status: FUNCTIONAL

## 2024-02-12 RX ORDER — FAMOTIDINE 10 MG/ML
20 INJECTION INTRAVENOUS ONCE
Refills: 0 | Status: COMPLETED | OUTPATIENT
Start: 2024-02-12 | End: 2024-02-12

## 2024-02-12 RX ORDER — CEFAZOLIN SODIUM 1 G
2000 VIAL (EA) INJECTION ONCE
Refills: 0 | Status: COMPLETED | OUTPATIENT
Start: 2024-02-12 | End: 2024-02-12

## 2024-02-12 RX ORDER — MAGNESIUM SULFATE 500 MG/ML
2 VIAL (ML) INJECTION ONCE
Refills: 0 | Status: COMPLETED | OUTPATIENT
Start: 2024-02-12 | End: 2024-02-12

## 2024-02-12 RX ORDER — KETOROLAC TROMETHAMINE 30 MG/ML
30 SYRINGE (ML) INJECTION EVERY 6 HOURS
Refills: 0 | Status: DISCONTINUED | OUTPATIENT
Start: 2024-02-12 | End: 2024-02-13

## 2024-02-12 RX ORDER — DEXAMETHASONE 0.5 MG/5ML
4 ELIXIR ORAL EVERY 6 HOURS
Refills: 0 | Status: DISCONTINUED | OUTPATIENT
Start: 2024-02-12 | End: 2024-02-15

## 2024-02-12 RX ORDER — ONDANSETRON 8 MG/1
4 TABLET, FILM COATED ORAL EVERY 6 HOURS
Refills: 0 | Status: DISCONTINUED | OUTPATIENT
Start: 2024-02-12 | End: 2024-02-15

## 2024-02-12 RX ORDER — CITRIC ACID/SODIUM CITRATE 300-500 MG
30 SOLUTION, ORAL ORAL ONCE
Refills: 0 | Status: COMPLETED | OUTPATIENT
Start: 2024-02-12 | End: 2024-02-12

## 2024-02-12 RX ORDER — DIPHENHYDRAMINE HCL 50 MG
25 CAPSULE ORAL EVERY 6 HOURS
Refills: 0 | Status: DISCONTINUED | OUTPATIENT
Start: 2024-02-13 | End: 2024-02-15

## 2024-02-12 RX ORDER — HYDROMORPHONE HYDROCHLORIDE 2 MG/ML
0.2 INJECTION INTRAMUSCULAR; INTRAVENOUS; SUBCUTANEOUS
Refills: 0 | Status: DISCONTINUED | OUTPATIENT
Start: 2024-02-12 | End: 2024-02-12

## 2024-02-12 RX ORDER — MAGNESIUM HYDROXIDE 400 MG/1
30 TABLET, CHEWABLE ORAL
Refills: 0 | Status: DISCONTINUED | OUTPATIENT
Start: 2024-02-12 | End: 2024-02-15

## 2024-02-12 RX ORDER — SODIUM CHLORIDE 9 MG/ML
1000 INJECTION, SOLUTION INTRAVENOUS
Refills: 0 | Status: DISCONTINUED | OUTPATIENT
Start: 2024-02-12 | End: 2024-02-13

## 2024-02-12 RX ORDER — IBUPROFEN 200 MG
600 TABLET ORAL EVERY 6 HOURS
Refills: 0 | Status: COMPLETED | OUTPATIENT
Start: 2024-02-13 | End: 2025-01-11

## 2024-02-12 RX ORDER — TETANUS TOXOID, REDUCED DIPHTHERIA TOXOID AND ACELLULAR PERTUSSIS VACCINE, ADSORBED 5; 2.5; 8; 8; 2.5 [IU]/.5ML; [IU]/.5ML; UG/.5ML; UG/.5ML; UG/.5ML
0.5 SUSPENSION INTRAMUSCULAR ONCE
Refills: 0 | Status: DISCONTINUED | OUTPATIENT
Start: 2024-02-13 | End: 2024-02-15

## 2024-02-12 RX ORDER — ACETAMINOPHEN 500 MG
1000 TABLET ORAL ONCE
Refills: 0 | Status: COMPLETED | OUTPATIENT
Start: 2024-02-12 | End: 2024-02-12

## 2024-02-12 RX ORDER — TRANEXAMIC ACID 100 MG/ML
1000 INJECTION, SOLUTION INTRAVENOUS ONCE
Refills: 0 | Status: COMPLETED | OUTPATIENT
Start: 2024-02-12 | End: 2024-02-12

## 2024-02-12 RX ORDER — OXYTOCIN 10 UNIT/ML
333.33 VIAL (ML) INJECTION
Qty: 20 | Refills: 0 | Status: DISCONTINUED | OUTPATIENT
Start: 2024-02-12 | End: 2024-02-15

## 2024-02-12 RX ORDER — SODIUM CHLORIDE 9 MG/ML
1000 INJECTION, SOLUTION INTRAVENOUS ONCE
Refills: 0 | Status: COMPLETED | OUTPATIENT
Start: 2024-02-12 | End: 2024-02-12

## 2024-02-12 RX ORDER — ACETAMINOPHEN 500 MG
975 TABLET ORAL
Refills: 0 | Status: DISCONTINUED | OUTPATIENT
Start: 2024-02-13 | End: 2024-02-15

## 2024-02-12 RX ORDER — NALOXONE HYDROCHLORIDE 4 MG/.1ML
0.1 SPRAY NASAL
Refills: 0 | Status: DISCONTINUED | OUTPATIENT
Start: 2024-02-12 | End: 2024-02-15

## 2024-02-12 RX ORDER — OXYTOCIN 10 UNIT/ML
333.33 VIAL (ML) INJECTION
Qty: 20 | Refills: 0 | Status: DISCONTINUED | OUTPATIENT
Start: 2024-02-13 | End: 2024-02-15

## 2024-02-12 RX ORDER — ACETAMINOPHEN 500 MG
975 TABLET ORAL EVERY 6 HOURS
Refills: 0 | Status: DISCONTINUED | OUTPATIENT
Start: 2024-02-12 | End: 2024-02-14

## 2024-02-12 RX ORDER — OXYCODONE HYDROCHLORIDE 5 MG/1
5 TABLET ORAL ONCE
Refills: 0 | Status: DISCONTINUED | OUTPATIENT
Start: 2024-02-13 | End: 2024-02-15

## 2024-02-12 RX ORDER — LANOLIN
1 OINTMENT (GRAM) TOPICAL EVERY 6 HOURS
Refills: 0 | Status: DISCONTINUED | OUTPATIENT
Start: 2024-02-13 | End: 2024-02-15

## 2024-02-12 RX ORDER — SIMETHICONE 80 MG/1
80 TABLET, CHEWABLE ORAL EVERY 4 HOURS
Refills: 0 | Status: DISCONTINUED | OUTPATIENT
Start: 2024-02-13 | End: 2024-02-15

## 2024-02-12 RX ORDER — CARBOPROST TROMETHAMINE 250 UG/ML
250 INJECTION, SOLUTION INTRAMUSCULAR
Refills: 0 | Status: DISCONTINUED | OUTPATIENT
Start: 2024-02-12 | End: 2024-02-15

## 2024-02-12 RX ORDER — OXYCODONE HYDROCHLORIDE 5 MG/1
5 TABLET ORAL
Refills: 0 | Status: COMPLETED | OUTPATIENT
Start: 2024-02-13 | End: 2024-02-20

## 2024-02-12 RX ORDER — CHLORHEXIDINE GLUCONATE 213 G/1000ML
1 SOLUTION TOPICAL DAILY
Refills: 0 | Status: DISCONTINUED | OUTPATIENT
Start: 2024-02-12 | End: 2024-02-15

## 2024-02-12 RX ORDER — SODIUM CHLORIDE 9 MG/ML
1000 INJECTION, SOLUTION INTRAVENOUS
Refills: 0 | Status: DISCONTINUED | OUTPATIENT
Start: 2024-02-13 | End: 2024-02-15

## 2024-02-12 RX ADMIN — Medication 30 MILLIGRAM(S): at 11:45

## 2024-02-12 RX ADMIN — Medication 30 MILLIGRAM(S): at 17:56

## 2024-02-12 RX ADMIN — FAMOTIDINE 20 MILLIGRAM(S): 10 INJECTION INTRAVENOUS at 07:27

## 2024-02-12 RX ADMIN — CHLORHEXIDINE GLUCONATE 1 APPLICATION(S): 213 SOLUTION TOPICAL at 07:20

## 2024-02-12 RX ADMIN — Medication 30 MILLILITER(S): at 07:38

## 2024-02-12 RX ADMIN — Medication 30 MILLIGRAM(S): at 11:30

## 2024-02-12 RX ADMIN — Medication 400 MILLIGRAM(S): at 10:50

## 2024-02-12 RX ADMIN — HYDROMORPHONE HYDROCHLORIDE 0.2 MILLIGRAM(S): 2 INJECTION INTRAMUSCULAR; INTRAVENOUS; SUBCUTANEOUS at 14:00

## 2024-02-12 RX ADMIN — Medication 1000 MILLIGRAM(S): at 10:45

## 2024-02-12 RX ADMIN — Medication 25 GRAM(S): at 15:53

## 2024-02-12 RX ADMIN — Medication 1000 MILLIUNIT(S)/MIN: at 10:14

## 2024-02-12 RX ADMIN — Medication 975 MILLIGRAM(S): at 20:36

## 2024-02-12 RX ADMIN — Medication 100 MILLIGRAM(S): at 13:15

## 2024-02-12 RX ADMIN — TRANEXAMIC ACID 220 MILLIGRAM(S): 100 INJECTION, SOLUTION INTRAVENOUS at 08:40

## 2024-02-12 RX ADMIN — SODIUM CHLORIDE 125 MILLILITER(S): 9 INJECTION, SOLUTION INTRAVENOUS at 15:53

## 2024-02-12 RX ADMIN — HYDROMORPHONE HYDROCHLORIDE 0.2 MILLIGRAM(S): 2 INJECTION INTRAMUSCULAR; INTRAVENOUS; SUBCUTANEOUS at 13:50

## 2024-02-12 RX ADMIN — SODIUM CHLORIDE 2000 MILLILITER(S): 9 INJECTION, SOLUTION INTRAVENOUS at 07:10

## 2024-02-12 RX ADMIN — Medication 100 MILLIGRAM(S): at 07:35

## 2024-02-12 NOTE — OB RN PATIENT PROFILE - NS_SOURCEOFINFO_OBGYN_ALL_OB
Independent    Active : Yes     Additional Comments:  works, daughter plans to stay with pt to assist as needed. IND and active prior, no use of an AD    OBJECTIVE:  Range of Motion:  Right Lower Extremity: WFL  Left Lower Extremity: Impaired - about 70 degrees of knee flexion    Strength:  Right Lower Extremity: WFL  Left Lower Extremity: Impaired - 3+/5 to 4/5   good quad control, able to complete partial SLR    Balance:  Static Sitting Balance:  Independent  Dynamic Sitting Balance: Independent  Static Standing Balance: Supervision, Stand By Assistance  Dynamic Standing Balance: Stand By Assistance  Pt required walker for support once in standing. Pt completed toileting tasks, able to complete self art care. Pt completed hand washing at sink, no LOB noted, decreased WS to L LE  Bed Mobility:  Supine to Sit: Stand By Assistance, X 1, with head of bed raised, with rail  Slow pace  Transfers:  Sit to Stand: Stand By Assistance, X 1  Stand to Sit:Stand By Assistance, Contact Guard Assistance, X 1  Varies on surface height, double platform walker for support  Ambulation:  Stand By Assistance, X 1, with increased time for completion  Distance: 15 feet, 250 feet  Surface: Level Tile  Device: double platform walker  Gait Deviations: Forward Flexed Posture, Slow Beatriz, Decreased Step Length on Right, Decreased Weight Shift Left, and Decreased Gait Speed  No LOB noted, good safety  Exercise:  Not completed, breakfast present    Functional Outcome Measures: Completed  AM-PAC Inpatient Mobility Raw Score : 18  AM-PAC Inpatient T-Scale Score : 43.63    ASSESSMENT:  Activity Tolerance:  Patient tolerance of  treatment: good. Treatment Initiated: Treatment and education initiated within context of evaluation.   Evaluation time included review of current medical information, gathering information related to past medical, social and functional history, completion of standardized testing, formal and informal observation of tasks, assessment of data and development of plan of care and goals. Treatment time included skilled education and facilitation of tasks to increase safety and independence with functional mobility for improved independence and quality of life. Assessment: Body Structures, Functions, Activity Limitations Requiring Skilled Therapeutic Intervention: Decreased functional mobility , Decreased strength, Decreased balance, Decreased posture, Increased pain, Decreased endurance  Assessment: Sera Grover is a 65 y.o. female who presents with the deficits stated previously. Pt requires 1 person assist for functional tasks with use of walker for support. Pt cont to require skilled PT services to increase IND with functional tasks and progress towards PLOF to return to home environment safely. Therapy Prognosis: Good    Requires PT Follow-Up: Yes    Discharge Recommendations:  Discharge Recommendations: Continue to assess pending progress, Outpatient PT    Patient Education:      .     Patient Education  Education Given To: Patient  Education Provided: Role of Therapy, Plan of Care, Precautions  Education Provided Comments: d/c planning  Education Method: Verbal  Education Outcome: Verbalized understanding, Demonstrated understanding, Continued education needed       Equipment Recommendations:  Equipment Needed: No    Plan:  Current Treatment Recommendations: Strengthening, Gait training, Functional mobility training, Balance training, Stair training, Neuromuscular re-education, Transfer training, Endurance training, Equipment evaluation, education, & procurement, Patient/Caregiver education & training, Safety education & training, Therapeutic activities, Home exercise program  General Plan:  (6x O)    Goals:  Patient Goals : home with outpatient PT services  Short Term Goals  Time Frame for Short Term Goals: by discharge  Short Term Goal 1: Pt will demo sit to/from stand transfers with RW for support with Patient

## 2024-02-12 NOTE — OB PROVIDER H&P - NSHPPHYSICALEXAM_GEN_ALL_CORE
BP: 114/83   HR: 91  Gen: NAD, A&Ox3  Abd: non-tender, gravid  LE: trace b/l pedal edema, R>L  Skin: no excoriations or rashes  Pulm: no increased WOB  SVE: deferred    FHT: Cat 1,  bpm, moderate variability, + accels, - decels.  [place]: Uterine irritability noted on toco  TAUS: Oblique, fetal head to maternal left, posterior placenta.

## 2024-02-12 NOTE — OB RN PATIENT PROFILE - NS_PRENATALHARD_OBGYN_ALL_OB
SUBJECTIVE  Ms. Venita Shipley presents today acutely for     Chief Complaint   Patient presents with    Foot Pain     pt here today concerned of growth between her toes on L foot baby toe; pt rates pain 10/10 & that growth has been there a long time      She has a hard raised round lesion on medial fifth toe, which is painful. She notes that it has been there for some time. OBJECTIVE  Visit Vitals  /84 (BP 1 Location: Left arm, BP Patient Position: Sitting)   Pulse 96   Temp 98.4 °F (36.9 °C) (Oral)   Resp 16   Ht 5' 5\" (1.651 m)   Wt 202 lb (91.6 kg)   SpO2 94%   BMI 33.61 kg/m²     Gen: Pleasant 62 y.o.  female in NAD.     EXTREMITIES: Warm. Callused lesion as noted above, about 3 mm in diameter. ASSESSMENT / PLAN    ICD-10-CM ICD-9-CM    1. Corn or callus L84 700 REFERRAL TO PODIATRY     She may also try OTC topical salicylate products for callus removal.     I have reviewed with the patient details of the assessment and plan and all questions were answered. Relevant patient education was performed. Follow-up and Dispositions    · Return if symptoms worsen or fail to improve. Available

## 2024-02-12 NOTE — LACTATION INITIAL EVALUATION - NS LACT CON REASON FOR REQ
Full term baby boy seen around 9 hrs of life. Mother s/p pph, implications of this were discussed with her and importance of additional expression encouraged. Colostrum expressible in small drops bilaterally upon hand expression as taught and offered to baby. Complete breastfeeding education provided to primip mom. Baby placed skin to skin and position and latch strategies taught for football hold. Baby attained a deep, sustained latch and feeding on left breast, sleepy, but responsive and sucking with stim. Frequent responsive feeds, increased ssc and rooming-in advised. All questions were answered, mother verbalized understanding of teaching and info given. Referral for lactation telehealth was placed for further support s/p d/c./primaparous mom

## 2024-02-12 NOTE — OB RN PATIENT PROFILE - NSRUBEOLASOURCE_OBGYN_ALL_OB
"Subjective   Patient ID: Machelle Bennett is a 22 y.o. male who presents for Follow-up (Numb feet. ).        HPI      Right foot numbness X 1 month , on the dorsum , initially intermittent but not persistent   No limitation on ventral aspect   Able to press gas and brake while driving   Does sense socks on the dorsum   Denies any trauma to the foot / leg   No dietary restriction   Sexually active. Denies any concern for sTIs     Visit Vitals  /68   Pulse 72   Ht 1.755 m (5' 9.09\")   Wt 75.9 kg (167 lb 6.4 oz)   SpO2 99%   BMI 24.66 kg/m²   Smoking Status Never   BSA 1.92 m²      No LMP for male patient.     Review of Systems    Constitutional : No feeling poorly / fevers/ chills / night sweats/ fatigue   Cardiovascular : No CP /Palpitations/ lower extremity edema / syncope   Respiratory : No Cough /HASTINGS/Dyspnea at rest   Gastrointestinal : No abd pain / N/V  No bloody stools/ melena / constipation  Endo : No polyuria/polydipsia/ muscle weakness / sluggishness   CNS: No confusion / HA/ tingling/ numbness/ weakness of extremities  Psychiatric: No anxiety/ depression/ SI/HI    All other systems have been reviewed and are negative for complaint       Physical Exam    Constitutional : Vitals reviewed. Alert and in no distress  Cardiovascular : RRR, Normal S1, S2, No pericardial rub/ gallop, no peripheral edema   Pulmonary: No respiratory distress, CTAB   MSK : Normal gait and station , strength and tone   Skin: Normal skin color and pigmentation, normal skin turgor and no rash   Neurologic : CNs 2-12 grossly intact , no obvious FNDs  SILT on b/l dorsum of feet   Psych : A,Ox3, normal mood and affect      Assessment/Plan   Diagnoses and all orders for this visit:  Paresthesia of right foot  -     Vitamin B12; Future  -     TSH with reflex to Free T4 if abnormal; Future  -     CBC; Future  -     Comprehensive Metabolic Panel; Future  -     Folate; Future    Paresthesia of dorsum of right foot   Labs as noted above   ? " Isolated peripheral neuropathy    If progressing / worsening , pt to RTO    hard copy, drawn during this pregnancy

## 2024-02-12 NOTE — OB PROVIDER H&P - HISTORY OF PRESENT ILLNESS
37 yo  at 37w0d presenting for a scheduled primary c/s for a large CORA fibroid, macrosomia (AC > 99&ile), and PEC w/o SF. Patient states that she ruled in for gHTN in January and had a 24 hr urine done on 23 that resulted at 110 and on 24 at 508. She endorses FM and mild LE edema. Denies VB, LOF, ctx, HA, visual changes, RUQ pain, N/V, dizziness or SOB.    ANTE: Spontaneous pregnancy. PEC w/o SF, macrosomia A:C > 99%ile. NIPT and anatomy scan WNL. GCT passed. GBS negative. Denies thyroid disorders. EFW 3423 g , 3600g by leopolds today.    OB: G1 - current  GynHx: Large 10.73tkd40.49x9.65 cm CORA fibroid. Hx of gonorrhea, treated . Denies ovarian cysts or abnormal PAP.  PMHx: Iron deficiency anemia  PSurgHx: Denies  Medications: ASA stopped at 36 weeks, PNV, PO iron  ALL: NKDA 39 yo  at 37w0d presenting for a scheduled primary c/s for a large CORA fibroid, macrosomia (AC > 99&ile), and PEC w/o SF. Patient states that she ruled in for gHTN in January and had a 24 hr urine done on 23 that resulted at 110 and on 24 at 508. She endorses FM and mild LE edema. Denies VB, LOF, ctx, HA, visual changes, RUQ pain, N/V, dizziness or SOB.  ANTE: Spontaneous pregnancy. PEC w/o SF, macrosomia A:C > 99%ile. NIPT and anatomy scan WNL. GCT passed. GBS negative. Denies thyroid disorders. EFW 3423 g , 3600g by leopolds today.    OB: G1 - current    GynHx: Large 10.88hkz42.49x9.65 cm CORA fibroid. Hx of gonorrhea, treated . Denies ovarian cysts or abnormal PAP.  PMHx: Iron deficiency anemia  PSurgHx: Denies  Medications: ASA stopped at 36 weeks, PNV, PO iron  ALL: NKDA     37 yo  at 37w0d presenting for a scheduled primary c/s for a large CORA fibroid, macrosomia (AC > 99&ile), and PEC w/o SF. Patient states that she ruled in for gHTN in January and had a 24 hr urine done on 23 that resulted at 110 and on 24 at 508. She endorses FM and mild LE edema. Denies VB, LOF, ctx, HA, visual changes, RUQ pain, N/V, dizziness or SOB.    ANTE: Spontaneous pregnancy. PEC w/o SF, macrosomia A:C > 99%ile. NIPT and anatomy scan WNL. GCT passed. GBS negative. Denies thyroid disorders. EFW 3423 g , 3600g by leopolds today.    OB: G1 - current    GynHx: Large 10.33bsr74.49x9.65 cm CORA fibroid. Hx of gonorrhea, treated . Denies ovarian cysts or abnormal PAP.  PMHx: Iron deficiency anemia  PSurgHx: Denies  Medications: ASA stopped at 36 weeks, PNV, PO iron  ALL: NKDA

## 2024-02-12 NOTE — OB PROVIDER DELIVERY SUMMARY - NSSELHIDDEN_OBGYN_ALL_OB_FT
[NS_DeliveryAttending1_OBGYN_ALL_OB_FT:[id number]],[NS_DeliveryAssist1_OBGYN_ALL_OB_FT:[id number]],[NS_DeliveryAttending2_OBGYN_ALL_OB_FT:[id number]]

## 2024-02-12 NOTE — OB RN DELIVERY SUMMARY - NS_SEPSISRSKCALC_OBGYN_ALL_OB_FT
EOS calculated successfully. EOS Risk Factor: 0.5/1000 live births ([facility] [place] [facility] [place] national incidence[place] GA=37w;Temp=97.2; ROM=0.017; GBS='Negative'; Antibiotics='No antibiotics or any antibiotics < 2 hrs prior to birth'

## 2024-02-12 NOTE — OB RN INTRAOPERATIVE NOTE - NSSELHIDDEN_OBGYN_ALL_OB_FT
[NS_DeliveryAttending1_OBGYN_ALL_OB_FT:[id number]],[NS_DeliveryAssist1_OBGYN_ALL_OB_FT:[id number]],[NS_DeliveryAttending2_OBGYN_ALL_OB_FT:[id number]],[NS_DeliveryRN_OBGYN_ALL_OB_FT:[id number]],[NS_CirculateRN2_OBGYN_ALL_OB_FT:[id number]=]

## 2024-02-12 NOTE — LACTATION INITIAL EVALUATION - LACTATION INTERVENTIONS
initiate/review safe skin-to-skin/initiate/review hand expression/post discharge community resources provided/reviewed components of an effective feeding and at least 8 effective feedings per day required/reviewed importance of monitoring infant diapers, the breastfeeding log, and minimum output each day/reviewed benefits and recommendations for rooming in/reviewed feeding on demand/by cue at least 8 times a day/reviewed indications of inadequate milk transfer that would require supplementation

## 2024-02-12 NOTE — OB RN INTRAOPERATIVE NOTE - NS_FROZENSECTION_OBGYN_ALL_OB
Call placed to patient as planned. Still has something, doesn't think Z-pack did anything. Still no temperature. Cough still present, but worse in the morning. Always has allergies, always worse this time of year anywhere. Will come in for labs tomorrow morning and then phone call for tomorrow moved to next Tuesday to ensure results are back and MD can look at whether patient will get IVIG or not. Discussed medication with patient and all questions answered.    No

## 2024-02-12 NOTE — OB RN PATIENT PROFILE - FALL HARM RISK - UNIVERSAL INTERVENTIONS
Bed in lowest position, wheels locked, appropriate side rails in place/Call bell, personal items and telephone in reach/Instruct patient to call for assistance before getting out of bed or chair/Non-slip footwear when patient is out of bed/[place] to call system/Purposeful Proactive Rounding/Room/bathroom lighting operational, light cord in reach

## 2024-02-12 NOTE — OB RN DELIVERY SUMMARY - NS_SCRUBTECH_OBGYN_ALL_OB_FT
Eliminate all forms of dairy/lactose (milk, cheese, butter, creamers, ice cream etc)  from your diet for 14 days to see if your symptoms improve.    We have referred you to psychology. If the wait for an appointment is too long, contact your insurance company for a list of psychologist that are covered.     We have ordered labs for further evaluation.    We have ordered an EGD/colonoscopy for further evaluation.    We have referred you to an allergist/imunologist at [facility] (Dr. Kaley Patel).   Miryam Delgado

## 2024-02-12 NOTE — PRE-ANESTHESIA EVALUATION ADULT - NSANTHOSAYNRD_GEN_A_CORE
No. ANJELICA screening performed.  STOP BANG Legend: 0-2 = LOW Risk; 3-4 = INTERMEDIATE Risk; 5-8 = HIGH Risk

## 2024-02-12 NOTE — OB PROVIDER H&P - NSMODPPHRISK_OBGYN_A_OB
Over-distended uterus: Multiple gestation, polyhydramnios, Macrosomia/Large Uterine fibroids Over-distended uterus: Multiple gestation, polyhydramnios, Macrosomia/Large Uterine fibroids/AMA - over 35 years of age/Aspirin use in pregnancy

## 2024-02-12 NOTE — OB NEONATOLOGY/PEDIATRICIAN DELIVERY SUMMARY - NSPEDSNEONOTESA_OBGYN_ALL_OB_FT
NICU called to delivery for large maternal fibroids and possible macrosomia. Baby emerged crying, vigorus, good tone. DCC x 30 sec. Placed on open warmer, dried, suctioned, stimulated. PE WNL. Cleared for WBN at this time.

## 2024-02-12 NOTE — OB RN DELIVERY SUMMARY - NSSELHIDDEN_OBGYN_ALL_OB_FT
[NS_DeliveryAttending1_OBGYN_ALL_OB_FT:[id number]],[NS_DeliveryAssist1_OBGYN_ALL_OB_FT:[id number]],[NS_DeliveryAttending2_OBGYN_ALL_OB_FT:[id number]],[NS_DeliveryRN_OBGYN_ALL_OB_FT:[id number]],[NS_CirculateRN2_OBGYN_ALL_OB_FT:[id number]]

## 2024-02-12 NOTE — OB PROVIDER H&P - ASSESSMENT
39 yo  at 37w0d presenting for a primary c/s for macrosomia, PEC w/o SF, and a large CORA fibroid.  - IV fluids, NPO  - Admit to L&D  - Continuous FHT and toco monitoring. Currently reactive and reassuring.  - Prenatals reviewed. GBS negative. No indication for antibiotic prophylaxis.  - Plan: Primary  delivery, plan for incision to be higher than normal due to CORA fibroid as noted on U/S.  - Risks, benefits, and alternatives discussed. Consents obtained.    D/W ANIL ConteC 39 yo  at 37w0d presenting for a primary c/s for macrosomia, PEC w/o SF, and a large CORA fibroid.  - IV fluids, NPO  - Admit to L&D  - Continuous FHT and toco monitoring. Currently reactive and reassuring.  - Prenatals reviewed. GBS negative.  - Plan: Primary  delivery, plan for incision to be higher than normal due to CORA fibroid as noted on U/S.  - Risks, benefits, and alternatives discussed. Consents obtained.    D/W BIBI Conte-C

## 2024-02-13 LAB
ANION GAP SERPL CALC-SCNC: 5 MMOL/L — SIGNIFICANT CHANGE UP (ref 5–17)
BASOPHILS # BLD AUTO: 0.02 K/UL — SIGNIFICANT CHANGE UP (ref 0–0.2)
BASOPHILS NFR BLD AUTO: 0.1 % — SIGNIFICANT CHANGE UP (ref 0–2)
BUN SERPL-MCNC: 7 MG/DL — SIGNIFICANT CHANGE UP (ref 7–23)
CALCIUM SERPL-MCNC: 8 MG/DL — LOW (ref 8.4–10.5)
CHLORIDE SERPL-SCNC: 108 MMOL/L — SIGNIFICANT CHANGE UP (ref 96–108)
CO2 SERPL-SCNC: 24 MMOL/L — SIGNIFICANT CHANGE UP (ref 22–31)
CREAT SERPL-MCNC: 0.61 MG/DL — SIGNIFICANT CHANGE UP (ref 0.5–1.3)
EGFR: 118 ML/MIN/1.73M2 — SIGNIFICANT CHANGE UP
EOSINOPHIL # BLD AUTO: 0.02 K/UL — SIGNIFICANT CHANGE UP (ref 0–0.5)
EOSINOPHIL NFR BLD AUTO: 0.1 % — SIGNIFICANT CHANGE UP (ref 0–6)
GLUCOSE SERPL-MCNC: 109 MG/DL — HIGH (ref 70–99)
HCT VFR BLD CALC: 21.4 % — LOW (ref 34.5–45)
HGB BLD-MCNC: 7.1 G/DL — LOW (ref 11.5–15.5)
IMM GRANULOCYTES NFR BLD AUTO: 0.8 % — SIGNIFICANT CHANGE UP (ref 0–0.9)
LYMPHOCYTES # BLD AUTO: 1.74 K/UL — SIGNIFICANT CHANGE UP (ref 1–3.3)
LYMPHOCYTES # BLD AUTO: 11.6 % — LOW (ref 13–44)
MAGNESIUM SERPL-MCNC: 1.8 MG/DL — SIGNIFICANT CHANGE UP (ref 1.6–2.6)
MCHC RBC-ENTMCNC: 30.9 PG — SIGNIFICANT CHANGE UP (ref 27–34)
MCHC RBC-ENTMCNC: 33.2 GM/DL — SIGNIFICANT CHANGE UP (ref 32–36)
MCV RBC AUTO: 93 FL — SIGNIFICANT CHANGE UP (ref 80–100)
MONOCYTES # BLD AUTO: 1.21 K/UL — HIGH (ref 0–0.9)
MONOCYTES NFR BLD AUTO: 8.1 % — SIGNIFICANT CHANGE UP (ref 2–14)
NEUTROPHILS # BLD AUTO: 11.89 K/UL — HIGH (ref 1.8–7.4)
NEUTROPHILS NFR BLD AUTO: 79.3 % — HIGH (ref 43–77)
NRBC # BLD: 0 /100 WBCS — SIGNIFICANT CHANGE UP (ref 0–0)
PHOSPHATE SERPL-MCNC: 2.7 MG/DL — SIGNIFICANT CHANGE UP (ref 2.5–4.5)
PLATELET # BLD AUTO: 183 K/UL — SIGNIFICANT CHANGE UP (ref 150–400)
POTASSIUM SERPL-MCNC: 4.1 MMOL/L — SIGNIFICANT CHANGE UP (ref 3.5–5.3)
POTASSIUM SERPL-SCNC: 4.1 MMOL/L — SIGNIFICANT CHANGE UP (ref 3.5–5.3)
RBC # BLD: 2.3 M/UL — LOW (ref 3.8–5.2)
RBC # FLD: 16.4 % — HIGH (ref 10.3–14.5)
SODIUM SERPL-SCNC: 137 MMOL/L — SIGNIFICANT CHANGE UP (ref 135–145)
WBC # BLD: 15 K/UL — HIGH (ref 3.8–10.5)
WBC # FLD AUTO: 15 K/UL — HIGH (ref 3.8–10.5)

## 2024-02-13 RX ORDER — OXYCODONE HYDROCHLORIDE 5 MG/1
5 TABLET ORAL
Refills: 0 | Status: DISCONTINUED | OUTPATIENT
Start: 2024-02-13 | End: 2024-02-15

## 2024-02-13 RX ORDER — ENOXAPARIN SODIUM 100 MG/ML
40 INJECTION SUBCUTANEOUS EVERY 24 HOURS
Refills: 0 | Status: DISCONTINUED | OUTPATIENT
Start: 2024-02-13 | End: 2024-02-15

## 2024-02-13 RX ORDER — SODIUM,POTASSIUM PHOSPHATES 278-250MG
1 POWDER IN PACKET (EA) ORAL EVERY 12 HOURS
Refills: 0 | Status: COMPLETED | OUTPATIENT
Start: 2024-02-13 | End: 2024-02-13

## 2024-02-13 RX ORDER — IBUPROFEN 200 MG
600 TABLET ORAL EVERY 6 HOURS
Refills: 0 | Status: DISCONTINUED | OUTPATIENT
Start: 2024-02-13 | End: 2024-02-15

## 2024-02-13 RX ORDER — IRON SUCROSE 20 MG/ML
200 INJECTION, SOLUTION INTRAVENOUS EVERY 24 HOURS
Refills: 0 | Status: DISCONTINUED | OUTPATIENT
Start: 2024-02-13 | End: 2024-02-15

## 2024-02-13 RX ORDER — OXYCODONE HYDROCHLORIDE 5 MG/1
5 TABLET ORAL ONCE
Refills: 0 | Status: DISCONTINUED | OUTPATIENT
Start: 2024-02-13 | End: 2024-02-13

## 2024-02-13 RX ORDER — MAGNESIUM SULFATE 500 MG/ML
1 VIAL (ML) INJECTION ONCE
Refills: 0 | Status: COMPLETED | OUTPATIENT
Start: 2024-02-13 | End: 2024-02-13

## 2024-02-13 RX ADMIN — Medication 975 MILLIGRAM(S): at 10:11

## 2024-02-13 RX ADMIN — OXYCODONE HYDROCHLORIDE 5 MILLIGRAM(S): 5 TABLET ORAL at 04:16

## 2024-02-13 RX ADMIN — OXYCODONE HYDROCHLORIDE 5 MILLIGRAM(S): 5 TABLET ORAL at 06:02

## 2024-02-13 RX ADMIN — Medication 1 TABLET(S): at 12:03

## 2024-02-13 RX ADMIN — Medication 1 PACKET(S): at 17:58

## 2024-02-13 RX ADMIN — Medication 100 GRAM(S): at 11:13

## 2024-02-13 RX ADMIN — OXYCODONE HYDROCHLORIDE 5 MILLIGRAM(S): 5 TABLET ORAL at 22:25

## 2024-02-13 RX ADMIN — OXYCODONE HYDROCHLORIDE 5 MILLIGRAM(S): 5 TABLET ORAL at 09:39

## 2024-02-13 RX ADMIN — Medication 600 MILLIGRAM(S): at 17:59

## 2024-02-13 RX ADMIN — Medication 975 MILLIGRAM(S): at 21:24

## 2024-02-13 RX ADMIN — ENOXAPARIN SODIUM 40 MILLIGRAM(S): 100 INJECTION SUBCUTANEOUS at 09:38

## 2024-02-13 RX ADMIN — Medication 975 MILLIGRAM(S): at 09:38

## 2024-02-13 RX ADMIN — Medication 600 MILLIGRAM(S): at 12:04

## 2024-02-13 RX ADMIN — IRON SUCROSE 110 MILLIGRAM(S): 20 INJECTION, SOLUTION INTRAVENOUS at 09:38

## 2024-02-13 RX ADMIN — Medication 30 MILLIGRAM(S): at 00:17

## 2024-02-13 RX ADMIN — OXYCODONE HYDROCHLORIDE 5 MILLIGRAM(S): 5 TABLET ORAL at 18:01

## 2024-02-13 RX ADMIN — OXYCODONE HYDROCHLORIDE 5 MILLIGRAM(S): 5 TABLET ORAL at 23:49

## 2024-02-13 RX ADMIN — OXYCODONE HYDROCHLORIDE 5 MILLIGRAM(S): 5 TABLET ORAL at 10:11

## 2024-02-13 RX ADMIN — Medication 1 PACKET(S): at 09:40

## 2024-02-13 RX ADMIN — OXYCODONE HYDROCHLORIDE 5 MILLIGRAM(S): 5 TABLET ORAL at 14:37

## 2024-02-13 RX ADMIN — OXYCODONE HYDROCHLORIDE 5 MILLIGRAM(S): 5 TABLET ORAL at 18:50

## 2024-02-13 RX ADMIN — OXYCODONE HYDROCHLORIDE 5 MILLIGRAM(S): 5 TABLET ORAL at 15:24

## 2024-02-13 RX ADMIN — SIMETHICONE 80 MILLIGRAM(S): 80 TABLET, CHEWABLE ORAL at 04:15

## 2024-02-13 RX ADMIN — Medication 600 MILLIGRAM(S): at 18:50

## 2024-02-13 RX ADMIN — Medication 975 MILLIGRAM(S): at 14:34

## 2024-02-13 RX ADMIN — Medication 600 MILLIGRAM(S): at 23:48

## 2024-02-13 RX ADMIN — Medication 30 MILLIGRAM(S): at 06:14

## 2024-02-13 NOTE — PROGRESS NOTE ADULT - ASSESSMENT
Assessment/Plan    38y y.o. F now POD#1 s/p pLTCS. AfVSS. Patient is progressing toward all post-op milestones. Pain is controlled on PO medications.    0. Expected acute blood loss anemia post-op  - IV iron 200 q24 [id number]    1. Pain  - Motrin and Tylenol ATC  - Oxycodone PRN    2. GI  - Tolerating regular diet without n/v  - Senna PRN    3.   - Edmondson out this AM  - F/u TOV    4. DVT prophylaxis  - Encouraging ambulation  - Lovenox 40 qD while in house    5. Dispo  - Anticipate dispo POD#2        Destiny Mullen MD PGY4

## 2024-02-14 LAB
BASOPHILS # BLD AUTO: 0.02 K/UL — SIGNIFICANT CHANGE UP (ref 0–0.2)
BASOPHILS NFR BLD AUTO: 0.1 % — SIGNIFICANT CHANGE UP (ref 0–2)
EOSINOPHIL # BLD AUTO: 0.04 K/UL — SIGNIFICANT CHANGE UP (ref 0–0.5)
EOSINOPHIL NFR BLD AUTO: 0.3 % — SIGNIFICANT CHANGE UP (ref 0–6)
HCT VFR BLD CALC: 21.5 % — LOW (ref 34.5–45)
HGB BLD-MCNC: 7.1 G/DL — LOW (ref 11.5–15.5)
IMM GRANULOCYTES NFR BLD AUTO: 1 % — HIGH (ref 0–0.9)
LYMPHOCYTES # BLD AUTO: 1.76 K/UL — SIGNIFICANT CHANGE UP (ref 1–3.3)
LYMPHOCYTES # BLD AUTO: 12 % — LOW (ref 13–44)
MCHC RBC-ENTMCNC: 30.9 PG — SIGNIFICANT CHANGE UP (ref 27–34)
MCHC RBC-ENTMCNC: 33 GM/DL — SIGNIFICANT CHANGE UP (ref 32–36)
MCV RBC AUTO: 93.5 FL — SIGNIFICANT CHANGE UP (ref 80–100)
MONOCYTES # BLD AUTO: 1.17 K/UL — HIGH (ref 0–0.9)
MONOCYTES NFR BLD AUTO: 8 % — SIGNIFICANT CHANGE UP (ref 2–14)
NEUTROPHILS # BLD AUTO: 11.51 K/UL — HIGH (ref 1.8–7.4)
NEUTROPHILS NFR BLD AUTO: 78.6 % — HIGH (ref 43–77)
NRBC # BLD: 0 /100 WBCS — SIGNIFICANT CHANGE UP (ref 0–0)
PLATELET # BLD AUTO: 199 K/UL — SIGNIFICANT CHANGE UP (ref 150–400)
RBC # BLD: 2.3 M/UL — LOW (ref 3.8–5.2)
RBC # FLD: 16.6 % — HIGH (ref 10.3–14.5)
WBC # BLD: 14.65 K/UL — HIGH (ref 3.8–10.5)
WBC # FLD AUTO: 14.65 K/UL — HIGH (ref 3.8–10.5)

## 2024-02-14 RX ADMIN — MAGNESIUM HYDROXIDE 30 MILLILITER(S): 400 TABLET, CHEWABLE ORAL at 21:27

## 2024-02-14 RX ADMIN — OXYCODONE HYDROCHLORIDE 5 MILLIGRAM(S): 5 TABLET ORAL at 05:42

## 2024-02-14 RX ADMIN — SIMETHICONE 80 MILLIGRAM(S): 80 TABLET, CHEWABLE ORAL at 01:40

## 2024-02-14 RX ADMIN — Medication 600 MILLIGRAM(S): at 23:56

## 2024-02-14 RX ADMIN — OXYCODONE HYDROCHLORIDE 5 MILLIGRAM(S): 5 TABLET ORAL at 13:28

## 2024-02-14 RX ADMIN — Medication 975 MILLIGRAM(S): at 08:47

## 2024-02-14 RX ADMIN — OXYCODONE HYDROCHLORIDE 5 MILLIGRAM(S): 5 TABLET ORAL at 01:38

## 2024-02-14 RX ADMIN — IRON SUCROSE 110 MILLIGRAM(S): 20 INJECTION, SOLUTION INTRAVENOUS at 08:47

## 2024-02-14 RX ADMIN — SIMETHICONE 80 MILLIGRAM(S): 80 TABLET, CHEWABLE ORAL at 14:04

## 2024-02-14 RX ADMIN — Medication 600 MILLIGRAM(S): at 11:56

## 2024-02-14 RX ADMIN — MAGNESIUM HYDROXIDE 30 MILLILITER(S): 400 TABLET, CHEWABLE ORAL at 09:00

## 2024-02-14 RX ADMIN — Medication 600 MILLIGRAM(S): at 18:12

## 2024-02-14 RX ADMIN — Medication 975 MILLIGRAM(S): at 14:03

## 2024-02-14 RX ADMIN — Medication 600 MILLIGRAM(S): at 05:42

## 2024-02-14 RX ADMIN — OXYCODONE HYDROCHLORIDE 5 MILLIGRAM(S): 5 TABLET ORAL at 21:27

## 2024-02-14 RX ADMIN — SIMETHICONE 80 MILLIGRAM(S): 80 TABLET, CHEWABLE ORAL at 05:42

## 2024-02-14 RX ADMIN — OXYCODONE HYDROCHLORIDE 5 MILLIGRAM(S): 5 TABLET ORAL at 04:41

## 2024-02-14 RX ADMIN — OXYCODONE HYDROCHLORIDE 5 MILLIGRAM(S): 5 TABLET ORAL at 20:33

## 2024-02-14 RX ADMIN — ENOXAPARIN SODIUM 40 MILLIGRAM(S): 100 INJECTION SUBCUTANEOUS at 07:44

## 2024-02-14 RX ADMIN — OXYCODONE HYDROCHLORIDE 5 MILLIGRAM(S): 5 TABLET ORAL at 02:11

## 2024-02-14 RX ADMIN — Medication 975 MILLIGRAM(S): at 02:11

## 2024-02-14 RX ADMIN — Medication 1 TABLET(S): at 11:56

## 2024-02-14 RX ADMIN — Medication 975 MILLIGRAM(S): at 20:33

## 2024-02-14 NOTE — PROGRESS NOTE ADULT - ATTENDING COMMENTS
I evaluated the patient as well at bedside and agree with the above A/P, I discussed with the patient concerns for symptomatic anemia but at this time she remains asymptomatic and is receiving IV iron with no issue. She is ambulating with expected pain at initiation of standing but otherwise well controlled with oral analgesics and is tolerating PO. She is tympanic to percussion of her abdomen from flatus that she has not yet passed but is otherwise well-appearing on physical exam. Recommend she passes flatus prior to discharge.

## 2024-02-14 NOTE — PROGRESS NOTE ADULT - ASSESSMENT
38y Female POD#2    s/p pC/S at 37+0 for fibroid uterus and suspected macrosomia c/b PPH with EBL 3000                                     - Neuro/Pain:  toradol atc, tylenol atc, oxy prn  - CV:  VS per routine  - Pulm: Encourage ISS & Ambulation  - GI: Advance as tolerated  - : Voiding spontaneously  - DVT ppx: SCDs, Lovenox 40mg QD  Heme: s/p 2u pRBC. Hb 7.1 - asymptomatic acute blood loss anemia. Receiving IV Fe  - Dispo: POD #2/3 38y Female POD#2 s/p pC/S at 37+0 for fibroid uterus and suspected macrosomia c/b PPH with EBL 3000                                     - Neuro/Pain:  toradol atc, tylenol atc, oxy prn  - CV:  VS per routine  - Pulm: Encourage ISS & Ambulation  - GI: Advance as tolerated  - : Voiding spontaneously  - DVT ppx: SCDs, Lovenox 40mg QD  Heme: s/p 2u pRBC. Hb 7.1 - asymptomatic acute blood loss anemia. Receiving IV Fe  - Dispo: POD #2/3 No

## 2024-02-15 ENCOUNTER — TRANSCRIPTION ENCOUNTER (OUTPATIENT)
Age: 39
End: 2024-02-15

## 2024-02-15 VITALS
TEMPERATURE: 98 F | HEART RATE: 108 BPM | RESPIRATION RATE: 18 BRPM | DIASTOLIC BLOOD PRESSURE: 84 MMHG | SYSTOLIC BLOOD PRESSURE: 148 MMHG | OXYGEN SATURATION: 99 %

## 2024-02-15 LAB
BASOPHILS # BLD AUTO: 0.03 K/UL — SIGNIFICANT CHANGE UP (ref 0–0.2)
BASOPHILS NFR BLD AUTO: 0.3 % — SIGNIFICANT CHANGE UP (ref 0–2)
EOSINOPHIL # BLD AUTO: 0.1 K/UL — SIGNIFICANT CHANGE UP (ref 0–0.5)
EOSINOPHIL NFR BLD AUTO: 0.9 % — SIGNIFICANT CHANGE UP (ref 0–6)
HCT VFR BLD CALC: 25 % — LOW (ref 34.5–45)
HGB BLD-MCNC: 8.1 G/DL — LOW (ref 11.5–15.5)
IMM GRANULOCYTES NFR BLD AUTO: 1.4 % — HIGH (ref 0–0.9)
LYMPHOCYTES # BLD AUTO: 1.79 K/UL — SIGNIFICANT CHANGE UP (ref 1–3.3)
LYMPHOCYTES # BLD AUTO: 15.2 % — SIGNIFICANT CHANGE UP (ref 13–44)
MCHC RBC-ENTMCNC: 30.7 PG — SIGNIFICANT CHANGE UP (ref 27–34)
MCHC RBC-ENTMCNC: 32.4 GM/DL — SIGNIFICANT CHANGE UP (ref 32–36)
MCV RBC AUTO: 94.7 FL — SIGNIFICANT CHANGE UP (ref 80–100)
MONOCYTES # BLD AUTO: 0.76 K/UL — SIGNIFICANT CHANGE UP (ref 0–0.9)
MONOCYTES NFR BLD AUTO: 6.5 % — SIGNIFICANT CHANGE UP (ref 2–14)
NEUTROPHILS # BLD AUTO: 8.91 K/UL — HIGH (ref 1.8–7.4)
NEUTROPHILS NFR BLD AUTO: 75.7 % — SIGNIFICANT CHANGE UP (ref 43–77)
NRBC # BLD: 0 /100 WBCS — SIGNIFICANT CHANGE UP (ref 0–0)
PLATELET # BLD AUTO: 249 K/UL — SIGNIFICANT CHANGE UP (ref 150–400)
RBC # BLD: 2.64 M/UL — LOW (ref 3.8–5.2)
RBC # FLD: 16.7 % — HIGH (ref 10.3–14.5)
WBC # BLD: 11.76 K/UL — HIGH (ref 3.8–10.5)
WBC # FLD AUTO: 11.76 K/UL — HIGH (ref 3.8–10.5)

## 2024-02-15 PROCEDURE — P9016: CPT

## 2024-02-15 PROCEDURE — C1765: CPT

## 2024-02-15 PROCEDURE — 83735 ASSAY OF MAGNESIUM: CPT

## 2024-02-15 PROCEDURE — 36430 TRANSFUSION BLD/BLD COMPNT: CPT

## 2024-02-15 PROCEDURE — 86901 BLOOD TYPING SEROLOGIC RH(D): CPT

## 2024-02-15 PROCEDURE — 85025 COMPLETE CBC W/AUTO DIFF WBC: CPT

## 2024-02-15 PROCEDURE — 86850 RBC ANTIBODY SCREEN: CPT

## 2024-02-15 PROCEDURE — 86923 COMPATIBILITY TEST ELECTRIC: CPT

## 2024-02-15 PROCEDURE — 88307 TISSUE EXAM BY PATHOLOGIST: CPT

## 2024-02-15 PROCEDURE — 88305 TISSUE EXAM BY PATHOLOGIST: CPT

## 2024-02-15 PROCEDURE — 85027 COMPLETE CBC AUTOMATED: CPT

## 2024-02-15 PROCEDURE — 82803 BLOOD GASES ANY COMBINATION: CPT

## 2024-02-15 PROCEDURE — 80048 BASIC METABOLIC PNL TOTAL CA: CPT

## 2024-02-15 PROCEDURE — 36415 COLL VENOUS BLD VENIPUNCTURE: CPT

## 2024-02-15 PROCEDURE — 86900 BLOOD TYPING SEROLOGIC ABO: CPT

## 2024-02-15 PROCEDURE — 86780 TREPONEMA PALLIDUM: CPT

## 2024-02-15 PROCEDURE — 84100 ASSAY OF PHOSPHORUS: CPT

## 2024-02-15 RX ORDER — ACETAMINOPHEN 500 MG
3 TABLET ORAL
Qty: 0 | Refills: 0 | DISCHARGE
Start: 2024-02-15

## 2024-02-15 RX ORDER — IBUPROFEN 200 MG
1 TABLET ORAL
Qty: 0 | Refills: 0 | DISCHARGE
Start: 2024-02-15

## 2024-02-15 RX ADMIN — OXYCODONE HYDROCHLORIDE 5 MILLIGRAM(S): 5 TABLET ORAL at 17:20

## 2024-02-15 RX ADMIN — OXYCODONE HYDROCHLORIDE 5 MILLIGRAM(S): 5 TABLET ORAL at 00:54

## 2024-02-15 RX ADMIN — Medication 600 MILLIGRAM(S): at 12:34

## 2024-02-15 RX ADMIN — Medication 600 MILLIGRAM(S): at 05:48

## 2024-02-15 RX ADMIN — IRON SUCROSE 110 MILLIGRAM(S): 20 INJECTION, SOLUTION INTRAVENOUS at 08:53

## 2024-02-15 RX ADMIN — ENOXAPARIN SODIUM 40 MILLIGRAM(S): 100 INJECTION SUBCUTANEOUS at 07:41

## 2024-02-15 RX ADMIN — Medication 975 MILLIGRAM(S): at 03:01

## 2024-02-15 RX ADMIN — Medication 600 MILLIGRAM(S): at 18:10

## 2024-02-15 RX ADMIN — Medication 1 TABLET(S): at 12:34

## 2024-02-15 RX ADMIN — Medication 975 MILLIGRAM(S): at 14:42

## 2024-02-15 RX ADMIN — SIMETHICONE 80 MILLIGRAM(S): 80 TABLET, CHEWABLE ORAL at 05:49

## 2024-02-15 RX ADMIN — OXYCODONE HYDROCHLORIDE 5 MILLIGRAM(S): 5 TABLET ORAL at 02:00

## 2024-02-15 RX ADMIN — Medication 975 MILLIGRAM(S): at 08:53

## 2024-02-15 RX ADMIN — MAGNESIUM HYDROXIDE 30 MILLILITER(S): 400 TABLET, CHEWABLE ORAL at 10:24

## 2024-02-15 NOTE — DISCHARGE NOTE OB - NS MD DC FALL RISK RISK
For information on Fall & Injury Prevention, visit: [email or web address] OR  [email or web address] OR  https://www.cdc.gov/steadi/patient.html skill demonstration/written material/individual instruction/verbal instruction

## 2024-02-15 NOTE — DISCHARGE NOTE OB - HOSPITAL COURSE
Patient admitted for primary  delivery due to fibroid uterus at 37w estimated gestational age. She underwent  delivery complicated by postpartum hemorrhage and received total of 3 units blood transfusion during postpartum course. At discharge she remains asymptomatic and hemodynamically stable.

## 2024-02-15 NOTE — DISCHARGE NOTE OB - CARE PLAN
Principal Discharge DX:	Pregnancy, delivered  Assessment and plan of treatment:	- Take Motrin 600mg every 6 hours and/or Tylenol 650mg every 6 hours as needed for pain  - Keep incision site clean and dry.  - Call your OBGYN to schedule a follow up appointment in 1-2 weeks.  - Call your OBGYN if you experiences severe abdominal pain not improved by oral pain medications, heavy bright red vaginal bleeding saturating more than 1 pad per hour, redness/warmth at incision site, drainage from incision site, or fever greater than 100.4F.  Secondary Diagnosis:	Postpartum hemorrhage  Assessment and plan of treatment:	- Nothing in vagina: no intercourse, tampons, or douching until cleared by your doctor.   - Avoid swimming, tub baths, and heavy lifting until cleared by your doctor.   - Showering is acceptable.   - Continue oral pain medications as directed and as needed for pain.    - Follow up in office in 1-2 weeks for your postoperative visit.    - Call the office sooner if you develop any fever, heavy bleeding, or severe pain.  - Go to the closest emergency room for any of these symptoms if you are not able to contact your doctor.   1

## 2024-02-15 NOTE — DISCHARGE NOTE OB - PROVIDER TOKENS
FREE:[LAST:[[address]],FIRST:[[facility]],PHONE:[phone]],FAX:[(   [phone]    -],ADDRESS:[[address] [place] [address]]]

## 2024-02-15 NOTE — DISCHARGE NOTE OB - PATIENT PORTAL LINK FT
You can access the FollowMyHealth Patient Portal offered by [facility] by registering at the following website: [email or web address]. By joining [employer]s FollowMyHealth portal, you will also be able to view your health information using other applications (apps) compatible with our system.

## 2024-02-15 NOTE — DISCHARGE NOTE OB - CARE PROVIDER_API CALL
[address], [facility]  [address]  [facility] [place] [address]  Phone: [phone]  Fax: (   )    -  Follow Up Time:

## 2024-02-15 NOTE — PROGRESS NOTE ADULT - ASSESSMENT
38y y.o. F now POD#3 s/p pLTCS. AfVSS. Patient is progressing toward all post-op milestones. Pain is controlled on PO medications. Post-transfusion CBC this AM showed appropriate rise in Hgb to 8.1. Anticipate discharge today.    0. Expected acute blood loss anemia post-op  - IV iron 200 q24 [id number]  - s/p 1u pRBC yesterday    1. Pain  - Motrin and Tylenol ATC  - Oxycodone PRN    2. GI  - Tolerating regular diet without n/v  - Senna PRN    3.   - Edmondson out this AM  - F/u TOV    4. DVT prophylaxis  - Encouraging ambulation  - Lovenox 40 qD while in house    5. Dispo  - Anticipate dispo POD#3        Destiny Mullen MD PGY4

## 2024-02-15 NOTE — PROGRESS NOTE ADULT - SUBJECTIVE AND OBJECTIVE BOX
Patient evaluated at bedside.   She reports pain is well controlled with OPM.  She has been ambulating without assistance, voiding spontaneously, tolerating regular diet. Has not yet passed flatus.   She denies HA, dizziness, CP, palpitations, SOB, n/v, or heavy vaginal bleeding.    Physical Exam:  Vital Signs Last 24 Hrs  T(C): 36.7 (14 Feb 2024 06:00), Max: 36.9 (13 Feb 2024 22:00)  T(F): 98.1 (14 Feb 2024 06:00), Max: 98.4 (13 Feb 2024 22:00)  HR: 103 (14 Feb 2024 06:00) (96 - 115)  BP: 109/68 (14 Feb 2024 06:00) (103/64 - 125/70)  BP(mean): --  RR: 16 (14 Feb 2024 06:00) (16 - 18)  SpO2: 96% (14 Feb 2024 06:00) (95% - 98%)    Parameters below as of 14 Feb 2024 06:00  Patient On (Oxygen Delivery Method): room air        Gen: NAD  Abd: + BS, soft, nontender, nondistended, no rebound or guarding  Incision clean, dry and intact with prevena in place  uterus firm at midline  : lochia WNL  Extremities: no swelling or calf tenderness                          7.1    14.65 )-----------( 199      ( 14 Feb 2024 05:30 )             21.5     02-13    137  |  108  |  7   ----------------------------<  109<H>  4.1   |  24  |  0.61    Ca    8.0<L>      13 Feb 2024 05:30  Phos  2.7     02-13  Mg     1.8     02-13          
Patient is a 38y y.o. F now POD #1 s/p pLTCS + myomectomy for large CORA fibroid and fetal macrosomia.    No acute events overnight. Patient was evaluated at bedside this AM. Pain is controlled with PO pain medications. Edmondson is in place and she has not yet ambulated. She endorses decreasing vaginal bleeding. Not yet passing gas.    Vital Signs Last 24 Hrs  T(C): 36.8 (13 Feb 2024 06:10), Max: 37.1 (12 Feb 2024 18:00)  T(F): 98.2 (13 Feb 2024 06:10), Max: 98.7 (12 Feb 2024 18:00)  HR: 92 (13 Feb 2024 06:10) (75 - 99)  BP: 96/59 (13 Feb 2024 06:10) (94/55 - 134/73)  BP(mean): 80 (12 Feb 2024 14:45) (68 - 93)  RR: 18 (13 Feb 2024 06:10) (16 - 18)  SpO2: 97% (13 Feb 2024 06:10) (94% - 100%)    Parameters below as of 13 Feb 2024 06:10  Patient On (Oxygen Delivery Method): room air        Physical Exam  Gen: Well-appearing. No acute distress. Resting comfortably in bed.  Resp: Breathing comfortably on RA.  Abd: Soft, appropriately TTP, non-distended. Uterus firm at umbilicus.  Incision: Prevena in place to suction  : Edmondson to gravity draining clear urine  Extremities: No calf tenderness.    Labs                          7.1    15.00 )-----------( 183      ( 13 Feb 2024 05:30 )             21.4     02-13    137  |  108  |  7   ----------------------------<  109<H>  4.1   |  24  |  0.61    Ca    8.0<L>      13 Feb 2024 05:30  Phos  2.7     02-13  Mg     1.8     02-13        Magnesium: 1.8 mg/dL (02-13 @ 05:30)        02-12-24 @ 07:01  -  02-13-24 @ 07:00  --------------------------------------------------------  IN: 3100 mL / OUT: 6830 mL / NET: -3730 mL      
Patient is a 38y y.o. F now POD #3 s/p pLTCS + myomectomy for large CORA fibroid and fetal macrosomia c/b PPH with EBL 3000 and intraop transfusion of 2u pRBCs.    No acute events overnight. Patient received 1u pRBCs yesterday for persistent mild tachycardia in the setting of Hgb 7.1. Patient was evaluated at bedside this AM. Pain is well-controlled with PO pain medications. She has been ambulating without difficulty and voiding spontaneously. She endorses decreasing vaginal bleeding. Passing gas. She denies chest pain, SOB, palpitations, dizziness, or lightheadedness.    Vital Signs Last 24 Hrs  T(C): 36.7 (15 Feb 2024 06:00), Max: 37.4 (14 Feb 2024 10:00)  T(F): 98 (15 Feb 2024 06:00), Max: 99.4 (14 Feb 2024 10:00)  HR: 101 (15 Feb 2024 06:00) (98 - 109)  BP: 105/68 (15 Feb 2024 06:00) (102/60 - 124/71)  BP(mean): --  RR: 18 (15 Feb 2024 06:00) (17 - 19)  SpO2: 95% (15 Feb 2024 06:00) (95% - 98%)    Parameters below as of 15 Feb 2024 06:00  Patient On (Oxygen Delivery Method): room air        Physical Exam  Gen: Well-appearing. No acute distress. Resting comfortably in bed.  Resp: Breathing comfortably on RA.  Abd: Soft, appropriately TTP, non-distended. Uterus firm at umbilicus.  Incision: Steri strips dry/intact  Extremities: No calf tenderness.    Labs                          8.1    11.76 )-----------( 249      ( 15 Feb 2024 09:00 )             25.0

## 2024-02-15 NOTE — DISCHARGE NOTE OB - PLAN OF CARE
- Nothing in vagina: no intercourse, tampons, or douching until cleared by your doctor.   - Avoid swimming, tub baths, and heavy lifting until cleared by your doctor.   - Showering is acceptable.   - Continue oral pain medications as directed and as needed for pain.    - Follow up in office in 1-2 weeks for your postoperative visit.    - Call the office sooner if you develop any fever, heavy bleeding, or severe pain.  - Go to the closest emergency room for any of these symptoms if you are not able to contact your doctor. - Take Motrin 600mg every 6 hours and/or Tylenol 650mg every 6 hours as needed for pain  - Keep incision site clean and dry.  - Call your OBGYN to schedule a follow up appointment in 1-2 weeks.  - Call your OBGYN if you experiences severe abdominal pain not improved by oral pain medications, heavy bright red vaginal bleeding saturating more than 1 pad per hour, redness/warmth at incision site, drainage from incision site, or fever greater than 100.4F.

## 2024-02-15 NOTE — DISCHARGE NOTE OB - MEDICATION SUMMARY - MEDICATIONS TO TAKE
I will START or STAY ON the medications listed below when I get home from the hospital:     mg oral tablet  -- 1 tab(s) by mouth every 6 hours As needed Moderate Pain (4 - 6)  -- Indication: For Pain    acetaminophen 325 mg oral tablet  -- 3 tab(s) by mouth every 6 hours  -- Indication: For Pain

## 2024-02-16 RX ORDER — OXYCODONE HYDROCHLORIDE 5 MG/1
1 TABLET ORAL
Qty: 4 | Refills: 0
Start: 2024-02-16 | End: 2024-02-16

## 2024-02-20 RX ORDER — OXYCODONE 5 MG/1
5 TABLET ORAL EVERY 6 HOURS
Qty: 8 | Refills: 0 | Status: ACTIVE | COMMUNITY
Start: 2024-02-16 | End: 1900-01-01

## 2024-02-22 DIAGNOSIS — D25.9 LEIOMYOMA OF UTERUS, UNSPECIFIED: ICD-10-CM

## 2024-02-22 DIAGNOSIS — Z3A.37 37 WEEKS GESTATION OF PREGNANCY: ICD-10-CM

## 2024-02-22 DIAGNOSIS — D62 ACUTE POSTHEMORRHAGIC ANEMIA: ICD-10-CM

## 2024-02-22 DIAGNOSIS — O34.13 MATERNAL CARE FOR BENIGN TUMOR OF CORPUS UTERI, THIRD TRIMESTER: ICD-10-CM

## 2024-02-22 DIAGNOSIS — Z28.09 IMMUNIZATION NOT CARRIED OUT BECAUSE OF OTHER CONTRAINDICATION: ICD-10-CM

## 2024-02-22 DIAGNOSIS — O36.63X0 MATERNAL CARE FOR EXCESSIVE FETAL GROWTH, THIRD TRIMESTER, NOT APPLICABLE OR UNSPECIFIED: ICD-10-CM

## 2024-02-26 ENCOUNTER — APPOINTMENT (OUTPATIENT)
Dept: OBGYN | Facility: CLINIC | Age: 39
End: 2024-02-26
Payer: MEDICAID

## 2024-02-26 VITALS — SYSTOLIC BLOOD PRESSURE: 140 MMHG | WEIGHT: 194 LBS | BODY MASS INDEX: 33.3 KG/M2 | DIASTOLIC BLOOD PRESSURE: 80 MMHG

## 2024-02-26 LAB — SURGICAL PATHOLOGY STUDY: SIGNIFICANT CHANGE UP

## 2024-02-26 PROCEDURE — 0503F POSTPARTUM CARE VISIT: CPT

## 2024-02-26 NOTE — HISTORY OF PRESENT ILLNESS
[Delivery Date: ___] : on [unfilled] [Primary C/S] : delivered by  section [Male] : Delivery History: baby boy [Breastfeeding] : not currently nursing [BF with Difficulty] : nursing without difficulty [Resumed Menses] : has not resumed her menses [Resumed [place]] : has not resumed intercourse [None] : No associated symptoms are reported [FreeTextEntry8] : 2 week incision check [FreeTextEntry1] : 38yo  s/p elective pCS for suspected macroscomia and large CORA fibroid c/b myomectomy and transfusion of 3u pRBCs. She was dx with PEC without SF in the pregnancy. CS was uncomplicated and patient states that she is recovering well. Had Prevena in place, removed herself at 1wk post op. States that she is pumping and formula feeding. Denies constipation or dysuria. Bonding well with baby.  present at visit, on paternity leave. Has been very helpful with baby. Pt states that she has had mild headache for 2 days (now resolved). Continues to take tylenol/ibuprofen around the clock, although does not wake up at night to take the medication. Pt denies vision changes, CP, SOB, N/V, HA, RUQ/epigastric pain, LE swelling. States that she has not taken her BP at home since delivery but has a BP cuff at home.   [place] depression screen score 3  PE:  /80, retaken 20min later with appropriately sized cuff 120/80 Gen: NAD Abd: soft, nontender, fundus below umbilicus. Steristrips removed, incision healing well.  VE: deferred   A/P 38yo  s/p elective pCS c/b PEC without SF for suspected macroscomia and large CORA fibroid.  -RTO 4wks -Meeting postpartum milestones  -Pt instructed to take BP 2x/day and keep record. Call for BP >140/80, go to ED for BP >160/110. Reviewed s/s PEC. -Low risk for PPD -Instructed to restart PO Mg for FRANCIS Chris PGY2 L[place] and discussed with Dr. Orozco

## 2024-03-25 ENCOUNTER — NON-APPOINTMENT (OUTPATIENT)
Age: 39
End: 2024-03-25

## 2024-03-25 ENCOUNTER — APPOINTMENT (OUTPATIENT)
Dept: OBGYN | Facility: CLINIC | Age: 39
End: 2024-03-25
Payer: MEDICAID

## 2024-03-25 VITALS — BODY MASS INDEX: 34.5 KG/M2 | DIASTOLIC BLOOD PRESSURE: 82 MMHG | WEIGHT: 201 LBS | SYSTOLIC BLOOD PRESSURE: 124 MMHG

## 2024-03-25 PROCEDURE — 0503F POSTPARTUM CARE VISIT: CPT

## 2024-03-25 NOTE — HISTORY OF PRESENT ILLNESS
[Male] : Delivery History: baby boy [Delivery Date: ___] : on [unfilled] [Postpartum Follow Up] : postpartum follow up [Primary C/S] : delivered by  section [BreastFeeding Problems] : breastfeeding problems [Clean/Dry/Intact] : clean, dry and intact [Healed] : healed [None] : no vaginal bleeding [Normal] : the vagina was normal [No Sign of Infection] : is showing no signs of infection [Doing Well] : is doing well [Not Done] : Examination of breasts not done [Excellent Pain Control] : has excellent pain control [Breastfeeding] : not currently nursing [BF with Difficulty] : nursing without difficulty [Resumed Menses] : has not resumed her menses [Resumed [place]] : has resumed intercourse [Intended Contraception] : Intended Contraception: [Breast Pain] : no breast pain [S/Sx PP Depression] : no signs/symptoms of postpartum depression [Heavy Bleeding] : no heavy bleeding [Incisional Drainage] : no incisional drainage [Incisional Pain] : no incisional pain [Irregular Bleeding] : no irregular bleeding [Vaginal Discharge] : no vaginal discharge [Erythema] : not erythematous [Swelling] : not swollen [Dehiscence] : not dehisced [FreeTextEntry8] : 6 week ppa [FreeTextEntry1] : 40yo  s/p primary mid-transverse  section at 37wx for large fibroid uterus and PEC w/o severe features c/b PPH and 3u prBCs here for 6wk postop visit. Patient states she is "almost back to normal" only occasionally takes Tylenol or Motrin for incision site pain. 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 Her blood pressures at home have been mostly normal and she continues to monitor them closely. She continues to breastfeed intermittently, notes her milk supply was always low and has decreased. She tried taking pro-lactation supplements which initially helped but she has become less consistent with it and attempting breastfeeding. Baby meeting all milestones per pt. She has resumed intercourse and is currently not using any contraceptive. She is aware of risks of short interval pregnancy in general and in particular with her C/S abdominal myomectomy for proper healing. She has good support at home from partner and mom lives downstairs. She notes intermittent constipation, but she is taking miralax with good improvement. She also continues to experience hemorrhoids which come and go, she is using preparation H and does feel they are overall improved. She also notes some joint pain (hips and knees) but that she is typically inactive at baseline and then will push herself and feels more discomfort then.   Plan:  - Reviewed importance of contraception and highly encourage at least condom use while deciding/awaiting higher fidelity contraception. Given history of PEC w/o severe features, estrogen-containing contraception is less ideal however BP have been normotensive postpartum. Patient is interested in Nexplanon- will obtain specialty pharmacy information from insurance company and order it today (pending authorization). If authorization declined pt encouraged to seek care at [facility] where Nexplanon may be more readily available. Patient again counseled on ideal interpregnancy interval of 18 months and risk of uterine rupture given her C/S (and partial myomectomy) particularly with a short interval pregnancy, pt in understanding. - Last pap 2023, not due today  - Urine pregnancy test today = negative - Encouraged to continue Miralax 2-3/week, fiber supplementation, colace daily and PO hydration along with preparation H to manage hemorrhoids  - Encouraged up titration of physical activity- aim for 15-20min per day of vigorous walking, then 30min per day, then ideally 60min (total) of moderately vigorous walking per day  - RTC for Nexplanon placement otherwise will seek care at outside clinic for Nexplanon - Pt in understanding and agreement with plan; RTC in 1 year or sooner if concerns/ symptoms arise

## 2024-04-15 NOTE — HISTORY OF PRESENT ILLNESS
[de-identified] : Юлия Payan is a 38-year-old female 33 wk pregnant, who is here for follow up of anemia.  Heme hx: Never anemic before per patient. She never needed a blood transfuson. FH: DM and cancer PMH: None PSH: dental work SH: no etOH use, no smoking Allergies: NKDA Meds: Prenatal vitamins, 325 mg Fe sulfate daily, ASA 81 (risk of preeclmsia), mg oxide 11/15/2023: Labs done with OB, showing Hb 9.9, MCV 98.1, no other cytopenias. Iron studies with iron saturation of 12%. Ferritin 81. She was subsequently started on po iron supplementation, which she has been taking for 2 wks now. 12/21/2023: Ferritin 57, Iron sat 17%, Hb 9.8, MCV 93.2 [de-identified] : Offers no complaints. Mild fatigue since start of pregnancy, o/w no bruising bleeding, no SOB, no CLARK.

## 2024-04-18 ENCOUNTER — LABORATORY RESULT (OUTPATIENT)
Age: 39
End: 2024-04-18

## 2024-04-18 ENCOUNTER — APPOINTMENT (OUTPATIENT)
Dept: HEMATOLOGY ONCOLOGY | Facility: CLINIC | Age: 39
End: 2024-04-18
Payer: MEDICAID

## 2024-04-18 DIAGNOSIS — O99.019 ANEMIA COMPLICATING PREGNANCY, UNSPECIFIED TRIMESTER: ICD-10-CM

## 2024-04-18 LAB
FERRITIN SERPL-MCNC: 66 NG/ML
IRON SATN MFR SERPL: 18 %
IRON SERPL-MCNC: 68 UG/DL
TIBC SERPL-MCNC: 374 UG/DL
UIBC SERPL-MCNC: 306 UG/DL

## 2024-04-18 PROCEDURE — 99212 OFFICE O/P EST SF 10 MIN: CPT

## 2024-04-19 LAB
HGB A MFR BLD: 97.5 %
HGB A2 MFR BLD: 2.5 %
HGB FRACT BLD-IMP: NORMAL

## 2024-05-01 ENCOUNTER — NON-APPOINTMENT (OUTPATIENT)
Age: 39
End: 2024-05-01

## 2024-05-01 LAB
ALPHA - GLOBIN COMMON MUTATION RESULT: NORMAL
ALPHA - GLOBIN MUTATION VERBATIM: NORMAL

## 2024-05-09 ENCOUNTER — LABORATORY RESULT (OUTPATIENT)
Age: 39
End: 2024-05-09

## 2024-05-09 ENCOUNTER — APPOINTMENT (OUTPATIENT)
Dept: OBGYN | Facility: CLINIC | Age: 39
End: 2024-05-09
Payer: MEDICAID

## 2024-05-09 VITALS — BODY MASS INDEX: 36.9 KG/M2 | DIASTOLIC BLOOD PRESSURE: 80 MMHG | WEIGHT: 215 LBS | SYSTOLIC BLOOD PRESSURE: 110 MMHG

## 2024-05-09 DIAGNOSIS — Z80.9 FAMILY HISTORY OF MALIGNANT NEOPLASM, UNSPECIFIED: ICD-10-CM

## 2024-05-09 DIAGNOSIS — Z00.00 ENCOUNTER FOR GENERAL ADULT MEDICAL EXAMINATION W/OUT ABNORMAL FINDINGS: ICD-10-CM

## 2024-05-09 PROCEDURE — 99214 OFFICE O/P EST MOD 30 MIN: CPT

## 2024-05-09 PROCEDURE — 99459 PELVIC EXAMINATION: CPT

## 2024-05-10 PROBLEM — Z80.9 FAMILY HISTORY OF MALIGNANT NEOPLASM: Status: ACTIVE | Noted: 2024-05-10

## 2024-05-15 LAB — CYTOLOGY CVX/VAG DOC THIN PREP: ABNORMAL

## 2024-05-16 DIAGNOSIS — Z30.9 ENCOUNTER FOR CONTRACEPTIVE MANAGEMENT, UNSPECIFIED: ICD-10-CM

## 2024-05-16 DIAGNOSIS — R87.810 CERVICAL HIGH RISK HUMAN PAPILLOMAVIRUS (HPV) DNA TEST POSITIVE: ICD-10-CM

## 2024-05-16 DIAGNOSIS — O16.9 UNSPECIFIED MATERNAL HYPERTENSION, UNSPECIFIED TRIMESTER: ICD-10-CM

## 2024-05-16 DIAGNOSIS — R52 OTHER COMPLICATIONS OF THE PUERPERIUM, NOT ELSEWHERE CLASSIFIED: ICD-10-CM

## 2024-05-16 DIAGNOSIS — O16.2 UNSPECIFIED MATERNAL HYPERTENSION, SECOND TRIMESTER: ICD-10-CM

## 2024-05-16 DIAGNOSIS — Z34.90 ENCOUNTER FOR SUPERVISION OF NORMAL PREGNANCY, UNSPECIFIED, UNSPECIFIED TRIMESTER: ICD-10-CM

## 2024-05-16 DIAGNOSIS — O09.90 SUPERVISION OF HIGH RISK PREGNANCY, UNSPECIFIED, UNSPECIFIED TRIMESTER: ICD-10-CM

## 2024-05-17 ENCOUNTER — NON-APPOINTMENT (OUTPATIENT)
Age: 39
End: 2024-05-17

## 2024-05-17 PROBLEM — O16.9 ELEVATED BLOOD PRESSURE AFFECTING PREGNANCY, ANTEPARTUM: Status: RESOLVED | Noted: 2023-11-30 | Resolved: 2024-05-17

## 2024-05-17 PROBLEM — O16.2 ELEVATED BLOOD PRESSURE AFFECTING PREGNANCY IN SECOND TRIMESTER, ANTEPARTUM: Status: RESOLVED | Noted: 2023-11-30 | Resolved: 2024-05-17

## 2024-05-17 PROBLEM — Z30.9 CONTRACEPTION MANAGEMENT: Status: ACTIVE | Noted: 2024-05-17

## 2024-05-17 PROBLEM — Z34.90 ENCOUNTER FOR PRENATAL CARE: Status: RESOLVED | Noted: 2023-08-25 | Resolved: 2024-05-17

## 2024-05-17 PROBLEM — R87.810 CERVICAL HIGH RISK HPV (HUMAN PAPILLOMAVIRUS) TEST POSITIVE: Status: ACTIVE | Noted: 2024-05-17

## 2024-05-17 PROBLEM — O09.90 HIGH-RISK PREGNANCY: Status: RESOLVED | Noted: 2023-11-16 | Resolved: 2024-05-17

## 2024-05-17 LAB — HPV HIGH+LOW RISK DNA PNL CVX: DETECTED

## 2024-05-17 NOTE — HISTORY OF PRESENT ILLNESS
[Patient reported PAP Smear was normal] : Patient reported PAP Smear was normal [FreeTextEntry1] : Юлия is now a  here for pap smear and pelvic exam. She had a postpartum exam end of March but was not due for a pap at that visit. She also never decided on which form of contraception she wanted to try - she has never used any hormonal contraception, only withdrawal and condoms. Her  and baby boy are with her again today. She is very happy as a new mom, no longer breast feeding and is back to work full time now. Noted some back and lower abdominal pain when bending at work from her waist and has since stopped doing. She has had 2 normal periods since delivery and has resumed "occasional" sexual intercourse but does not have a normal libido yet. She realizes it is strongly advised that she wait at least 18mths if not longer for another pregnancy given the significant myomectomy, C/S and PRBCs requirement of her 1st delivery 2024. She has not read about her contraceptive options in detail and would like the website again to do so.  She has been monitoring her BPs at home since delivery, less frequently now but does not get readings > 140s/ > 90s and usually much below.  Of note, she mentioned today she has several members on her father's and mother's side with variety of cancer diagnosis but for certain, only her paternal GMA had a breast CA diagnosis (unsure what age).   [PapSmeardate] : 5/2023

## 2024-05-17 NOTE — PHYSICAL EXAM
[Chaperone Present] : A chaperone was present in the examining room during all aspects of the physical examination [93975] : A chaperone was present during the pelvic exam. [Appropriately responsive] : appropriately responsive [Alert] : alert [No Acute Distress] : no acute distress [Soft] : soft [Non-tender] : non-tender [Non-distended] : non-distended [Oriented x3] : oriented x3 [Labia Majora] : normal [Labia Minora] : normal [No Bleeding] : There was no active vaginal bleeding [Normal] : normal [Enlarged ___ wks] : enlarged [unfilled] ~Uweeks [Uterine Adnexae] : non-palpable [FreeTextEntry7] : well-healed LTCS scar [Tenderness] : nontender

## 2024-05-17 NOTE — PLAN
[FreeTextEntry1] : # pap completed today # discussed in detail again her contraceptive options and strong recommendation against another pregnancy and delivery for at least 18mths since this one - gave information again on [email or web address] - she promises to read and decide asap - if she chooses CHCs, she will RTO 3mths after initiating for BP check but she denies cHTN, DVT/PE, poorly controlled DM, SLE, migraines with aura, smoking cigarettes, liver disease, hx of breast or uterine cancer. She was made aware these diagnoses present an increased risk for thromboembolic events (DVT/PE/stroke) when taking COCs and will inform us immediately if she receives any of these as a new diagnosis as that would necessitate a change to non-E2 containing contraception. # hx PEC w/o SF - to follow up w/ PCP (she has one near home) at least annually  RTO for contraception or earlier PRN

## 2024-05-17 NOTE — PHYSICAL EXAM
[Chaperone Present] : A chaperone was present in the examining room during all aspects of the physical examination [[id number]] : A chaperone was present during the pelvic exam. [Appropriately responsive] : appropriately responsive [Alert] : alert [No Acute Distress] : no acute distress [Soft] : soft [Non-tender] : non-tender [Non-distended] : non-distended [Oriented x3] : oriented x3 [Labia Majora] : normal [Labia Minora] : normal [No Bleeding] : There was no active vaginal bleeding [Normal] : normal [Enlarged ___ wks] : enlarged [unfilled] ~Uweeks [Uterine Adnexae] : non-palpable [FreeTextEntry7] : well-healed LTCS scar [Tenderness] : nontender

## 2024-05-29 RX ORDER — NORETHINDRONE ACETATE AND ETHINYL ESTRADIOL AND FERROUS FUMARATE 1MG-20(21)
1-20 KIT ORAL
Qty: 3 | Refills: 3 | Status: ACTIVE | COMMUNITY
Start: 2024-05-17 | End: 1900-01-01

## 2024-05-29 RX ORDER — HUMAN PAPILLOMAVIRUS 9-VALENT VACCINE, RECOMBINANT 30; 40; 60; 40; 20; 20; 20; 20; 20 UG/.5ML; UG/.5ML; UG/.5ML; UG/.5ML; UG/.5ML; UG/.5ML; UG/.5ML; UG/.5ML; UG/.5ML
INJECTION, SUSPENSION INTRAMUSCULAR
Qty: 1 | Refills: 0 | Status: ACTIVE | COMMUNITY
Start: 2024-05-29 | End: 1900-01-01

## 2024-05-29 RX ORDER — HUMAN PAPILLOMAVIRUS 9-VALENT VACCINE, RECOMBINANT 30; 40; 60; 40; 20; 20; 20; 20; 20 UG/.5ML; UG/.5ML; UG/.5ML; UG/.5ML; UG/.5ML; UG/.5ML; UG/.5ML; UG/.5ML; UG/.5ML
INJECTION, SUSPENSION INTRAMUSCULAR
Qty: 1 | Refills: 0 | Status: DISCONTINUED | COMMUNITY
Start: 2024-05-17 | End: 2024-05-29

## 2024-07-22 ENCOUNTER — NON-APPOINTMENT (OUTPATIENT)
Age: 39
End: 2024-07-22

## 2024-08-22 ENCOUNTER — APPOINTMENT (OUTPATIENT)
Dept: OBGYN | Facility: CLINIC | Age: 39
End: 2024-08-22
Payer: MEDICAID

## 2024-08-22 VITALS — SYSTOLIC BLOOD PRESSURE: 130 MMHG | DIASTOLIC BLOOD PRESSURE: 80 MMHG

## 2024-08-22 DIAGNOSIS — Z30.9 ENCOUNTER FOR CONTRACEPTIVE MANAGEMENT, UNSPECIFIED: ICD-10-CM

## 2024-08-22 PROCEDURE — 99202 OFFICE O/P NEW SF 15 MIN: CPT

## 2024-08-23 NOTE — DISCUSSION/SUMMARY
[FreeTextEntry1] : AMADEO is a 38y/o F who presents to the clinic for OCP surveillance. Pt reports and appears to be doing well without acute findings at this time.   #Contraception - Pt satisfied with Junel contraception and without reported acute changes; Rx refill provided - Pt encouraged to inform clinic of acute changes of symptoms regarding her headaches, she is aware and in agreement. Her headaches appear chronic and to pre-date the initiation of OCP but we reviewed that if she developed worsening symptoms or if we felt that OCPs were causing new headaches, that these would be reasons to discontinue OCPs. Patient will monitor - Patient with one mild range BP in pregnancy however no formal hypertensive diagnosis, and has been normotensive postpartum and again today. Therefore, she remains appropriate candidate for combined hormonal method.  RTC PRN or for annual appt.

## 2024-08-23 NOTE — HISTORY OF PRESENT ILLNESS
[Patient reported PAP Smear was normal] : Patient reported PAP Smear was normal [Oral Contraceptive] : uses oral contraception pills [Y] : Positive pregnancy history [Normal Amount/Duration] :  normal amount and duration [Regular Cycle Intervals] : periods have been regular [Currently Active] : currently active [PapSmeardate] : 5/9/2024 [TextBox_31] : ASCUS /HPV+ [LMPDate] : 8/11/2024 [de-identified] : Junel [PGxTotal] : 1 [facility] [FreeTextEntry1] : 8/11/2024

## 2024-08-23 NOTE — DISCUSSION/SUMMARY
[FreeTextEntry1] : AMADEO is a 40y/o F who presents to the clinic for OCP surveillance. Pt reports and appears to be doing well without acute findings at this time.   #Contraception - Pt satisfied with Junel contraception and without reported acute changes; Rx refill provided - Pt encouraged to inform clinic of acute changes of symptoms regarding her headaches, she is aware and in agreement. Her headaches appear chronic and to pre-date the initiation of OCP but we reviewed that if she developed worsening symptoms or if we felt that OCPs were causing new headaches, that these would be reasons to discontinue OCPs. Patient will monitor - Patient with one mild range BP in pregnancy however no formal hypertensive diagnosis, and has been normotensive postpartum and again today. Therefore, she remains appropriate candidate for combined hormonal method.  RTC PRN or for annual appt.

## 2024-08-23 NOTE — HISTORY OF PRESENT ILLNESS
[Patient reported PAP Smear was normal] : Patient reported PAP Smear was normal [Oral Contraceptive] : uses oral contraception pills [Y] : Positive pregnancy history [Normal Amount/Duration] :  normal amount and duration [Regular Cycle Intervals] : periods have been regular [Currently Active] : currently active [PapSmeardate] : 5/9/2024 [TextBox_31] : ASCUS /HPV+ [LMPDate] : 8/11/2024 [de-identified] : Junel [PGxTotal] : 1 [[facility]] : 1 [FreeTextEntry1] : 8/11/2024

## 2024-11-04 ENCOUNTER — TRANSCRIPTION ENCOUNTER (OUTPATIENT)
Age: 39
End: 2024-11-04

## 2024-11-05 ENCOUNTER — TRANSCRIPTION ENCOUNTER (OUTPATIENT)
Age: 39
End: 2024-11-05

## 2024-11-06 ENCOUNTER — TRANSCRIPTION ENCOUNTER (OUTPATIENT)
Age: 39
End: 2024-11-06

## 2024-11-06 RX ORDER — NORETHINDRONE ACETATE AND ETHINYL ESTRADIOL 1.5; 3 MG/1; UG/1
1.5-3 TABLET ORAL
Qty: 5 | Refills: 0 | Status: DISCONTINUED | COMMUNITY
Start: 2024-11-04 | End: 2024-11-06

## 2024-11-06 RX ORDER — NORETHINDRONE ACETATE AND ETHINYL ESTRADIOL AND FERROUS FUMARATE 1MG-20(21)
1-20 KIT ORAL
Qty: 3 | Refills: 4 | Status: ACTIVE | COMMUNITY
Start: 2024-11-06 | End: 1900-01-01

## 2024-11-08 ENCOUNTER — TRANSCRIPTION ENCOUNTER (OUTPATIENT)
Age: 39
End: 2024-11-08

## 2025-02-11 ENCOUNTER — TRANSCRIPTION ENCOUNTER (OUTPATIENT)
Age: 40
End: 2025-02-11

## 2025-07-11 ENCOUNTER — TRANSCRIPTION ENCOUNTER (OUTPATIENT)
Age: 40
End: 2025-07-11

## 2025-07-14 ENCOUNTER — TRANSCRIPTION ENCOUNTER (OUTPATIENT)
Age: 40
End: 2025-07-14

## 2025-07-17 ENCOUNTER — TRANSCRIPTION ENCOUNTER (OUTPATIENT)
Age: 40
End: 2025-07-17